# Patient Record
Sex: MALE | Race: BLACK OR AFRICAN AMERICAN | NOT HISPANIC OR LATINO | URBAN - METROPOLITAN AREA
[De-identification: names, ages, dates, MRNs, and addresses within clinical notes are randomized per-mention and may not be internally consistent; named-entity substitution may affect disease eponyms.]

---

## 2018-09-11 ENCOUNTER — EMERGENCY (EMERGENCY)
Facility: HOSPITAL | Age: 58
LOS: 1 days | Discharge: HOME | End: 2018-09-11
Attending: EMERGENCY MEDICINE
Payer: COMMERCIAL

## 2018-09-11 VITALS — WEIGHT: 141.1 LBS

## 2018-09-11 VITALS
HEIGHT: 66 IN | HEART RATE: 103 BPM | OXYGEN SATURATION: 99 % | TEMPERATURE: 98 F | DIASTOLIC BLOOD PRESSURE: 96 MMHG | WEIGHT: 141.98 LBS | SYSTOLIC BLOOD PRESSURE: 149 MMHG | RESPIRATION RATE: 16 BRPM

## 2018-09-11 DIAGNOSIS — R10.30 LOWER ABDOMINAL PAIN, UNSPECIFIED: ICD-10-CM

## 2018-09-11 LAB
ALBUMIN SERPL ELPH-MCNC: 3.5 G/DL — SIGNIFICANT CHANGE UP (ref 3.3–5)
ALP SERPL-CCNC: 68 U/L — SIGNIFICANT CHANGE UP (ref 40–120)
ALT FLD-CCNC: 20 U/L — SIGNIFICANT CHANGE UP (ref 12–78)
ANION GAP SERPL CALC-SCNC: 12 MMOL/L — SIGNIFICANT CHANGE UP (ref 5–17)
APPEARANCE UR: ABNORMAL
APTT BLD: 37.5 SEC — HIGH (ref 27.5–37.4)
AST SERPL-CCNC: 19 U/L — SIGNIFICANT CHANGE UP (ref 15–37)
BACTERIA # UR AUTO: ABNORMAL
BILIRUB SERPL-MCNC: 0.7 MG/DL — SIGNIFICANT CHANGE UP (ref 0.2–1.2)
BILIRUB UR-MCNC: NEGATIVE — SIGNIFICANT CHANGE UP
BUN SERPL-MCNC: 9 MG/DL — SIGNIFICANT CHANGE UP (ref 7–23)
CALCIUM SERPL-MCNC: 9.1 MG/DL — SIGNIFICANT CHANGE UP (ref 8.5–10.1)
CHLORIDE SERPL-SCNC: 101 MMOL/L — SIGNIFICANT CHANGE UP (ref 96–108)
CO2 SERPL-SCNC: 26 MMOL/L — SIGNIFICANT CHANGE UP (ref 22–31)
COLOR SPEC: YELLOW — SIGNIFICANT CHANGE UP
CREAT SERPL-MCNC: 0.97 MG/DL — SIGNIFICANT CHANGE UP (ref 0.5–1.3)
DIFF PNL FLD: ABNORMAL
EPI CELLS # UR: SIGNIFICANT CHANGE UP
GLUCOSE SERPL-MCNC: 79 MG/DL — SIGNIFICANT CHANGE UP (ref 70–99)
GLUCOSE UR QL: NEGATIVE MG/DL — SIGNIFICANT CHANGE UP
HCT VFR BLD CALC: 55.7 % — HIGH (ref 39–50)
HGB BLD-MCNC: 18.7 G/DL — HIGH (ref 13–17)
INR BLD: 1.01 RATIO — SIGNIFICANT CHANGE UP (ref 0.88–1.16)
KETONES UR-MCNC: ABNORMAL
LEUKOCYTE ESTERASE UR-ACNC: NEGATIVE — SIGNIFICANT CHANGE UP
LIDOCAIN IGE QN: 203 U/L — SIGNIFICANT CHANGE UP (ref 73–393)
MCHC RBC-ENTMCNC: 30.7 PG — SIGNIFICANT CHANGE UP (ref 27–34)
MCHC RBC-ENTMCNC: 33.6 GM/DL — SIGNIFICANT CHANGE UP (ref 32–36)
MCV RBC AUTO: 91.5 FL — SIGNIFICANT CHANGE UP (ref 80–100)
NITRITE UR-MCNC: NEGATIVE — SIGNIFICANT CHANGE UP
NRBC # BLD: 0 /100 WBCS — SIGNIFICANT CHANGE UP (ref 0–0)
PH UR: 6 — SIGNIFICANT CHANGE UP (ref 5–8)
PLATELET # BLD AUTO: 345 K/UL — SIGNIFICANT CHANGE UP (ref 150–400)
POTASSIUM SERPL-MCNC: 4.1 MMOL/L — SIGNIFICANT CHANGE UP (ref 3.5–5.3)
POTASSIUM SERPL-SCNC: 4.1 MMOL/L — SIGNIFICANT CHANGE UP (ref 3.5–5.3)
PROT SERPL-MCNC: 7.9 GM/DL — SIGNIFICANT CHANGE UP (ref 6–8.3)
PROT UR-MCNC: 15 MG/DL
PROTHROM AB SERPL-ACNC: 11 SEC — SIGNIFICANT CHANGE UP (ref 9.8–12.7)
RBC # BLD: 6.09 M/UL — HIGH (ref 4.2–5.8)
RBC # FLD: 14.2 % — SIGNIFICANT CHANGE UP (ref 10.3–14.5)
RBC CASTS # UR COMP ASSIST: SIGNIFICANT CHANGE UP /HPF (ref 0–4)
SODIUM SERPL-SCNC: 139 MMOL/L — SIGNIFICANT CHANGE UP (ref 135–145)
SP GR SPEC: 1.02 — SIGNIFICANT CHANGE UP (ref 1.01–1.02)
UROBILINOGEN FLD QL: 1 MG/DL
WBC # BLD: 8.36 K/UL — SIGNIFICANT CHANGE UP (ref 3.8–10.5)
WBC # FLD AUTO: 8.36 K/UL — SIGNIFICANT CHANGE UP (ref 3.8–10.5)
WBC UR QL: SIGNIFICANT CHANGE UP

## 2018-09-11 PROCEDURE — 99285 EMERGENCY DEPT VISIT HI MDM: CPT

## 2018-09-11 PROCEDURE — 74177 CT ABD & PELVIS W/CONTRAST: CPT | Mod: 26

## 2018-09-11 RX ORDER — IOHEXOL 300 MG/ML
30 INJECTION, SOLUTION INTRAVENOUS ONCE
Qty: 0 | Refills: 0 | Status: COMPLETED | OUTPATIENT
Start: 2018-09-11 | End: 2018-09-11

## 2018-09-11 RX ORDER — SODIUM CHLORIDE 9 MG/ML
1000 INJECTION INTRAMUSCULAR; INTRAVENOUS; SUBCUTANEOUS ONCE
Qty: 0 | Refills: 0 | Status: COMPLETED | OUTPATIENT
Start: 2018-09-11 | End: 2018-09-11

## 2018-09-11 RX ORDER — MORPHINE SULFATE 50 MG/1
4 CAPSULE, EXTENDED RELEASE ORAL ONCE
Qty: 0 | Refills: 0 | Status: DISCONTINUED | OUTPATIENT
Start: 2018-09-11 | End: 2018-09-11

## 2018-09-11 RX ADMIN — MORPHINE SULFATE 4 MILLIGRAM(S): 50 CAPSULE, EXTENDED RELEASE ORAL at 20:27

## 2018-09-11 RX ADMIN — SODIUM CHLORIDE 1000 MILLILITER(S): 9 INJECTION INTRAMUSCULAR; INTRAVENOUS; SUBCUTANEOUS at 20:26

## 2018-09-11 RX ADMIN — IOHEXOL 30 MILLILITER(S): 300 INJECTION, SOLUTION INTRAVENOUS at 20:27

## 2018-09-11 NOTE — ED ADULT NURSE NOTE - NSIMPLEMENTINTERV_GEN_ALL_ED
Implemented All Universal Safety Interventions:  [place] to call system. Call bell, personal items and telephone within reach. Instruct patient to call for assistance. Room bathroom lighting operational. Non-slip footwear when patient is off stretcher. Physically safe environment: no spills, clutter or unnecessary equipment. Stretcher in lowest position, wheels locked, appropriate side rails in place.

## 2018-09-11 NOTE — ED ADULT NURSE NOTE - OBJECTIVE STATEMENT
58 y.o male aaox4 ambultory [place] [facility] [place]. Patient reports having low abd pain for 4 weeks now, no nausea or vomtiing, had a small bowel movement today, no chills or fever. Denies any dysuria.

## 2018-09-12 NOTE — ED PROVIDER NOTE - OBJECTIVE STATEMENT
57 yo M with lower abd pain radiating across lower abdomen.  No associated symptoms, no n/v, no other complaints.  Pt. feels well otherwise.  Pain present for two weeks.   ROS: negative for fever, cough, headache, chest pain, shortness of breath, nausea, vomiting, diarrhea, rash, paresthesia, and weakness--all other systems reviewed are negative.   PMH: negative; Meds: Denies; SH: Denies smoking/drinking/drug use

## 2018-09-12 NOTE — ED PROVIDER NOTE - PHYSICAL EXAMINATION
Vitals: WNL  Gen: AAOx3, NAD, sitting comfortably in stretcher  Head: ncat, perrla, eomi b/l  Neck: supple, no lymphadenopathy, no midline deviation  Heart: rrr, no m/r/g  Lungs: CTA b/l, no rales/ronchi/wheezes  Abd: soft, tender in RL and LLQs, non-distended, no rebound or guarding  Ext: no clubbing/cyanosis/edema  Neuro: sensation and muscle strength intact b/l, steady gait

## 2018-09-21 ENCOUNTER — EMERGENCY (EMERGENCY)
Facility: HOSPITAL | Age: 58
LOS: 1 days | Discharge: HOME | End: 2018-09-21
Attending: EMERGENCY MEDICINE | Admitting: EMERGENCY MEDICINE
Payer: COMMERCIAL

## 2018-09-21 VITALS
OXYGEN SATURATION: 100 % | TEMPERATURE: 99 F | SYSTOLIC BLOOD PRESSURE: 161 MMHG | DIASTOLIC BLOOD PRESSURE: 96 MMHG | HEART RATE: 103 BPM | RESPIRATION RATE: 20 BRPM

## 2018-09-21 VITALS
DIASTOLIC BLOOD PRESSURE: 99 MMHG | OXYGEN SATURATION: 100 % | SYSTOLIC BLOOD PRESSURE: 158 MMHG | TEMPERATURE: 98 F | RESPIRATION RATE: 18 BRPM | HEART RATE: 120 BPM

## 2018-09-21 LAB
ALBUMIN SERPL ELPH-MCNC: 3.8 G/DL — SIGNIFICANT CHANGE UP (ref 3.3–5)
ALP SERPL-CCNC: 56 U/L — SIGNIFICANT CHANGE UP (ref 40–120)
ALT FLD-CCNC: 10 U/L — SIGNIFICANT CHANGE UP (ref 4–41)
APTT BLD: 31.4 SEC — SIGNIFICANT CHANGE UP (ref 27.5–37.4)
AST SERPL-CCNC: 13 U/L — SIGNIFICANT CHANGE UP (ref 4–40)
BASE EXCESS BLDV CALC-SCNC: 3.5 MMOL/L — SIGNIFICANT CHANGE UP
BASOPHILS # BLD AUTO: 0.03 K/UL — SIGNIFICANT CHANGE UP (ref 0–0.2)
BASOPHILS NFR BLD AUTO: 0.4 % — SIGNIFICANT CHANGE UP (ref 0–2)
BILIRUB SERPL-MCNC: 0.4 MG/DL — SIGNIFICANT CHANGE UP (ref 0.2–1.2)
BLD GP AB SCN SERPL QL: NEGATIVE — SIGNIFICANT CHANGE UP
BLOOD GAS VENOUS - CREATININE: 0.84 MG/DL — SIGNIFICANT CHANGE UP (ref 0.5–1.3)
BUN SERPL-MCNC: 9 MG/DL — SIGNIFICANT CHANGE UP (ref 7–23)
CALCIUM SERPL-MCNC: 9.2 MG/DL — SIGNIFICANT CHANGE UP (ref 8.4–10.5)
CHLORIDE BLDV-SCNC: 103 MMOL/L — SIGNIFICANT CHANGE UP (ref 96–108)
CHLORIDE SERPL-SCNC: 94 MMOL/L — LOW (ref 98–107)
CO2 SERPL-SCNC: 24 MMOL/L — SIGNIFICANT CHANGE UP (ref 22–31)
CREAT SERPL-MCNC: 0.84 MG/DL — SIGNIFICANT CHANGE UP (ref 0.5–1.3)
EOSINOPHIL # BLD AUTO: 0.05 K/UL — SIGNIFICANT CHANGE UP (ref 0–0.5)
EOSINOPHIL NFR BLD AUTO: 0.7 % — SIGNIFICANT CHANGE UP (ref 0–6)
GAS PNL BLDV: 131 MMOL/L — LOW (ref 136–146)
GLUCOSE BLDV-MCNC: 107 — HIGH (ref 70–99)
GLUCOSE SERPL-MCNC: 102 MG/DL — HIGH (ref 70–99)
HCO3 BLDV-SCNC: 26 MMOL/L — SIGNIFICANT CHANGE UP (ref 20–27)
HCT VFR BLD CALC: 54.1 % — HIGH (ref 39–50)
HCT VFR BLDV CALC: 56.9 % — HIGH (ref 39–51)
HGB BLD-MCNC: 18.4 G/DL — HIGH (ref 13–17)
HGB BLDV-MCNC: 18.6 G/DL — HIGH (ref 13–17)
IMM GRANULOCYTES # BLD AUTO: 0.02 # — SIGNIFICANT CHANGE UP
IMM GRANULOCYTES NFR BLD AUTO: 0.3 % — SIGNIFICANT CHANGE UP (ref 0–1.5)
INR BLD: 1.09 — SIGNIFICANT CHANGE UP (ref 0.88–1.17)
LACTATE BLDV-MCNC: 1.1 MMOL/L — SIGNIFICANT CHANGE UP (ref 0.5–2)
LYMPHOCYTES # BLD AUTO: 2.44 K/UL — SIGNIFICANT CHANGE UP (ref 1–3.3)
LYMPHOCYTES # BLD AUTO: 32 % — SIGNIFICANT CHANGE UP (ref 13–44)
MAGNESIUM SERPL-MCNC: 1.9 MG/DL — SIGNIFICANT CHANGE UP (ref 1.6–2.6)
MCHC RBC-ENTMCNC: 30.8 PG — SIGNIFICANT CHANGE UP (ref 27–34)
MCHC RBC-ENTMCNC: 34 % — SIGNIFICANT CHANGE UP (ref 32–36)
MCV RBC AUTO: 90.6 FL — SIGNIFICANT CHANGE UP (ref 80–100)
MONOCYTES # BLD AUTO: 0.6 K/UL — SIGNIFICANT CHANGE UP (ref 0–0.9)
MONOCYTES NFR BLD AUTO: 7.9 % — SIGNIFICANT CHANGE UP (ref 2–14)
NEUTROPHILS # BLD AUTO: 4.49 K/UL — SIGNIFICANT CHANGE UP (ref 1.8–7.4)
NEUTROPHILS NFR BLD AUTO: 58.7 % — SIGNIFICANT CHANGE UP (ref 43–77)
NRBC # FLD: 0 — SIGNIFICANT CHANGE UP
PCO2 BLDV: 45 MMHG — SIGNIFICANT CHANGE UP (ref 41–51)
PH BLDV: 7.41 PH — SIGNIFICANT CHANGE UP (ref 7.32–7.43)
PHOSPHATE SERPL-MCNC: 3.9 MG/DL — SIGNIFICANT CHANGE UP (ref 2.5–4.5)
PLATELET # BLD AUTO: 352 K/UL — SIGNIFICANT CHANGE UP (ref 150–400)
PMV BLD: 8.6 FL — SIGNIFICANT CHANGE UP (ref 7–13)
PO2 BLDV: 38 MMHG — SIGNIFICANT CHANGE UP (ref 35–40)
POTASSIUM BLDV-SCNC: 3.4 MMOL/L — SIGNIFICANT CHANGE UP (ref 3.4–4.5)
POTASSIUM SERPL-MCNC: 3.9 MMOL/L — SIGNIFICANT CHANGE UP (ref 3.5–5.3)
POTASSIUM SERPL-SCNC: 3.9 MMOL/L — SIGNIFICANT CHANGE UP (ref 3.5–5.3)
PROT SERPL-MCNC: 7.1 G/DL — SIGNIFICANT CHANGE UP (ref 6–8.3)
PROTHROM AB SERPL-ACNC: 12.1 SEC — SIGNIFICANT CHANGE UP (ref 9.8–13.1)
RBC # BLD: 5.97 M/UL — HIGH (ref 4.2–5.8)
RBC # FLD: 13.5 % — SIGNIFICANT CHANGE UP (ref 10.3–14.5)
RH IG SCN BLD-IMP: NEGATIVE — SIGNIFICANT CHANGE UP
SAO2 % BLDV: 67.1 % — SIGNIFICANT CHANGE UP (ref 60–85)
SODIUM SERPL-SCNC: 137 MMOL/L — SIGNIFICANT CHANGE UP (ref 135–145)
WBC # BLD: 7.63 K/UL — SIGNIFICANT CHANGE UP (ref 3.8–10.5)
WBC # FLD AUTO: 7.63 K/UL — SIGNIFICANT CHANGE UP (ref 3.8–10.5)

## 2018-09-21 PROCEDURE — 74177 CT ABD & PELVIS W/CONTRAST: CPT | Mod: 26

## 2018-09-21 PROCEDURE — 99285 EMERGENCY DEPT VISIT HI MDM: CPT

## 2018-09-21 RX ORDER — DOCUSATE SODIUM 100 MG
1 CAPSULE ORAL
Qty: 42 | Refills: 0 | OUTPATIENT
Start: 2018-09-21 | End: 2018-10-04

## 2018-09-21 RX ORDER — OXYCODONE HYDROCHLORIDE 5 MG/1
1 TABLET ORAL
Qty: 20 | Refills: 0 | OUTPATIENT
Start: 2018-09-21 | End: 2018-09-25

## 2018-09-21 RX ORDER — SENNA PLUS 8.6 MG/1
2 TABLET ORAL
Qty: 28 | Refills: 0 | OUTPATIENT
Start: 2018-09-21 | End: 2018-10-04

## 2018-09-21 RX ORDER — POLYETHYLENE GLYCOL 3350 17 G/17G
17 POWDER, FOR SOLUTION ORAL
Qty: 119 | Refills: 0 | OUTPATIENT
Start: 2018-09-21

## 2018-09-21 RX ORDER — MORPHINE SULFATE 50 MG/1
4 CAPSULE, EXTENDED RELEASE ORAL ONCE
Qty: 0 | Refills: 0 | Status: DISCONTINUED | OUTPATIENT
Start: 2018-09-21 | End: 2018-09-21

## 2018-09-21 RX ADMIN — MORPHINE SULFATE 4 MILLIGRAM(S): 50 CAPSULE, EXTENDED RELEASE ORAL at 16:16

## 2018-09-21 NOTE — ED ADULT TRIAGE NOTE - CHIEF COMPLAINT QUOTE
states" I had a colonoscopy on the 17th of this month and has severe pain with abdominal distention and constipated." denies any PMH

## 2018-09-21 NOTE — ED PROVIDER NOTE - MEDICAL DECISION MAKING DETAILS
58 y.o. male with recent colonoscopy on 9/18 for mass found on CT abdomen/pelvis p/w abdominal pain following colonoscopy.

## 2018-09-21 NOTE — ED PROVIDER NOTE - PROGRESS NOTE DETAILS
Spoke with Dr. Joseph Willard, Dr. Koenig's covering partner. Informed him CT results were negative for perforation or obstruction. Ok from his stand point to go home given no complication at this time. Pt pain is improved from before. He is okay with going home, would like to be sent home with a Bowel regiment.

## 2018-09-21 NOTE — ED PROVIDER NOTE - ATTENDING CONTRIBUTION TO CARE
Dr. Najera: I have personally seen and examined this patient at the bedside. I have fully participated in the care of this patient. I have reviewed all pertinent clinical information, including history, physical exam, plan and the Resident's note and agree except as noted. 57yo male recently diagnosed metastatic disease p/w 2-3 wk worsening abd pain, constipation, and decr po DDX high suspicion sbo, increased metastatic disease, colitis PLAN ct abd pelvis po and iv contrast, pain control, labs, admit for surgery or inpt gi.

## 2018-09-21 NOTE — ED ADULT NURSE NOTE - OBJECTIVE STATEMENT
Pt received a&ox3, c/o lower abd pain x 1 week, abd soft non tender, does not appears distended however pt states appears distended from baseline, had colonoscopy 1 week ago, denies NV, endorses mild constipation x 2 days, appears comfortable, pt seen here 1 week ago w ct scan showing concerns for liver and colon ca, pt denies hx of ca, NAD, md eval performed, 16 gauge IV placed in right ac, labs drawn and sent, will continue to monitor.

## 2018-09-21 NOTE — ED PROVIDER NOTE - OBJECTIVE STATEMENT
58 y.o. male with recent colonoscopy on 9/18 for liver mets found on CT abdomen/pelvis p/w abdominal pain. The patient states since the procedure he has been having cramping abdominal pain along the lower abdomen. He also has been having associated constipation and decreased PO intake since the procedure. The patient had similar pain a few weeks, which lead to CT scan being done originally which had found the mets. Colonoscopy showed narrowing of the sigmoid and a polyp in the cecum with pathology showing a benign neoplasm. Currently denies fever, chills, night sweats, cough, nasal congestion, dyspnea, chest pain, palpations, nausea/vomiting, diarrhea, dysuria, urinary frequency, leg swelling, joint pain, and rashes.    Dr. Koenig GI - [phone]  Spoke over the phone concern for obstruction vs perforation. Recommned CT abd/pelvis and advance GI consult if needed for a stent.

## 2018-09-28 ENCOUNTER — OUTPATIENT (OUTPATIENT)
Dept: OUTPATIENT SERVICES | Facility: HOSPITAL | Age: 58
LOS: 1 days | Discharge: HOME | End: 2018-09-28
Payer: COMMERCIAL

## 2018-09-28 DIAGNOSIS — R10.814 LEFT LOWER QUADRANT ABDOMINAL TENDERNESS: ICD-10-CM

## 2018-09-28 PROCEDURE — 74018 RADEX ABDOMEN 1 VIEW: CPT | Mod: 26

## 2018-10-12 PROBLEM — Z00.00 ENCOUNTER FOR PREVENTIVE HEALTH EXAMINATION: Status: ACTIVE | Noted: 2018-10-12

## 2018-10-15 ENCOUNTER — APPOINTMENT (OUTPATIENT)
Dept: SURGICAL ONCOLOGY | Facility: CLINIC | Age: 58
End: 2018-10-15
Payer: COMMERCIAL

## 2018-10-15 VITALS
OXYGEN SATURATION: 96 % | BODY MASS INDEX: 19.13 KG/M2 | RESPIRATION RATE: 17 BRPM | SYSTOLIC BLOOD PRESSURE: 138 MMHG | HEART RATE: 128 BPM | DIASTOLIC BLOOD PRESSURE: 95 MMHG | HEIGHT: 66 IN | WEIGHT: 119 LBS | TEMPERATURE: 98.8 F

## 2018-10-15 DIAGNOSIS — K56.699 OTHER INTESTINAL OBSTRUCTION UNSPECIFIED AS TO PARTIAL VERSUS COMPLETE OBSTRUCTION: ICD-10-CM

## 2018-10-15 PROCEDURE — 99245 OFF/OP CONSLTJ NEW/EST HI 55: CPT

## 2018-10-16 ENCOUNTER — FORM ENCOUNTER (OUTPATIENT)
Age: 58
End: 2018-10-16

## 2018-10-17 ENCOUNTER — OUTPATIENT (OUTPATIENT)
Dept: OUTPATIENT SERVICES | Facility: HOSPITAL | Age: 58
LOS: 1 days | End: 2018-10-17
Payer: COMMERCIAL

## 2018-10-17 ENCOUNTER — APPOINTMENT (OUTPATIENT)
Dept: NUCLEAR MEDICINE | Facility: IMAGING CENTER | Age: 58
End: 2018-10-17
Payer: COMMERCIAL

## 2018-10-17 DIAGNOSIS — K56.699 OTHER INTESTINAL OBSTRUCTION UNSPECIFIED AS TO PARTIAL VERSUS COMPLETE OBSTRUCTION: ICD-10-CM

## 2018-10-17 PROCEDURE — 78815 PET IMAGE W/CT SKULL-THIGH: CPT | Mod: 26,PI

## 2018-10-17 PROCEDURE — 78815 PET IMAGE W/CT SKULL-THIGH: CPT

## 2018-10-17 PROCEDURE — A9552: CPT

## 2018-10-18 ENCOUNTER — OUTPATIENT (OUTPATIENT)
Dept: OUTPATIENT SERVICES | Facility: HOSPITAL | Age: 58
LOS: 1 days | Discharge: HOME | End: 2018-10-18

## 2018-10-18 DIAGNOSIS — C18.9 MALIGNANT NEOPLASM OF COLON, UNSPECIFIED: ICD-10-CM

## 2018-10-19 ENCOUNTER — RESULT REVIEW (OUTPATIENT)
Age: 58
End: 2018-10-19

## 2018-10-19 ENCOUNTER — APPOINTMENT (OUTPATIENT)
Age: 58
End: 2018-10-19
Payer: COMMERCIAL

## 2018-10-19 VITALS
WEIGHT: 116.17 LBS | RESPIRATION RATE: 16 BRPM | TEMPERATURE: 98.1 F | HEIGHT: 67.2 IN | SYSTOLIC BLOOD PRESSURE: 145 MMHG | BODY MASS INDEX: 18.02 KG/M2 | OXYGEN SATURATION: 98 % | HEART RATE: 125 BPM | DIASTOLIC BLOOD PRESSURE: 100 MMHG

## 2018-10-19 DIAGNOSIS — Z80.0 FAMILY HISTORY OF MALIGNANT NEOPLASM OF DIGESTIVE ORGANS: ICD-10-CM

## 2018-10-19 DIAGNOSIS — Z78.9 OTHER SPECIFIED HEALTH STATUS: ICD-10-CM

## 2018-10-19 DIAGNOSIS — Z87.891 PERSONAL HISTORY OF NICOTINE DEPENDENCE: ICD-10-CM

## 2018-10-19 DIAGNOSIS — K59.00 CONSTIPATION, UNSPECIFIED: ICD-10-CM

## 2018-10-19 LAB
ALBUMIN SERPL ELPH-MCNC: 3.6 G/DL
ALP BLD-CCNC: 120 U/L
ALT SERPL-CCNC: 12 U/L
ANION GAP SERPL CALC-SCNC: 21 MMOL/L
APTT BLD: 30.5 SEC
AST SERPL-CCNC: 15 U/L
BASOPHILS # BLD AUTO: 0 K/UL — SIGNIFICANT CHANGE UP (ref 0–0.2)
BASOPHILS NFR BLD AUTO: 0.3 % — SIGNIFICANT CHANGE UP (ref 0–2)
BILIRUB SERPL-MCNC: 0.7 MG/DL
BUN SERPL-MCNC: 18 MG/DL
CALCIUM SERPL-MCNC: 9.5 MG/DL
CEA SERPL-MCNC: 37 NG/ML
CHLORIDE SERPL-SCNC: 93 MMOL/L
CO2 SERPL-SCNC: 25 MMOL/L
CREAT SERPL-MCNC: 0.64 MG/DL
EOSINOPHIL # BLD AUTO: 0 K/UL — SIGNIFICANT CHANGE UP (ref 0–0.5)
EOSINOPHIL NFR BLD AUTO: 0.2 % — SIGNIFICANT CHANGE UP (ref 0–6)
GLUCOSE SERPL-MCNC: 107 MG/DL
HBV CORE IGG+IGM SER QL: NONREACTIVE
HBV SURFACE AB SER QL: NONREACTIVE
HBV SURFACE AG SER QL: NONREACTIVE
HCT VFR BLD CALC: 49.7 % — SIGNIFICANT CHANGE UP (ref 39–50)
HCV AB SER QL: NONREACTIVE
HCV S/CO RATIO: 0.1 S/CO
HGB BLD-MCNC: 16.3 G/DL — SIGNIFICANT CHANGE UP (ref 13–17)
INR PPP: 1.11 RATIO
LYMPHOCYTES # BLD AUTO: 1 K/UL — SIGNIFICANT CHANGE UP (ref 1–3.3)
LYMPHOCYTES # BLD AUTO: 11.9 % — LOW (ref 13–44)
MCHC RBC-ENTMCNC: 30.4 PG — SIGNIFICANT CHANGE UP (ref 27–34)
MCHC RBC-ENTMCNC: 32.7 G/DL — SIGNIFICANT CHANGE UP (ref 32–36)
MCV RBC AUTO: 92.7 FL — SIGNIFICANT CHANGE UP (ref 80–100)
MONOCYTES # BLD AUTO: 0.6 K/UL — SIGNIFICANT CHANGE UP (ref 0–0.9)
MONOCYTES NFR BLD AUTO: 7.6 % — SIGNIFICANT CHANGE UP (ref 2–14)
NEUTROPHILS # BLD AUTO: 6.6 K/UL — SIGNIFICANT CHANGE UP (ref 1.8–7.4)
NEUTROPHILS NFR BLD AUTO: 80.1 % — HIGH (ref 43–77)
PLATELET # BLD AUTO: 484 K/UL — HIGH (ref 150–400)
POTASSIUM SERPL-SCNC: 4.2 MMOL/L
PROT SERPL-MCNC: 6.9 G/DL
PT BLD: 12.6 SEC
RBC # BLD: 5.36 M/UL — SIGNIFICANT CHANGE UP (ref 4.2–5.8)
RBC # FLD: 12.7 % — SIGNIFICANT CHANGE UP (ref 10.3–14.5)
SODIUM SERPL-SCNC: 139 MMOL/L
WBC # BLD: 8.3 K/UL — SIGNIFICANT CHANGE UP (ref 3.8–10.5)
WBC # FLD AUTO: 8.3 K/UL — SIGNIFICANT CHANGE UP (ref 3.8–10.5)

## 2018-10-19 PROCEDURE — 99245 OFF/OP CONSLTJ NEW/EST HI 55: CPT

## 2018-10-19 RX ORDER — MIRTAZAPINE 15 MG/1
15 TABLET, FILM COATED ORAL
Qty: 30 | Refills: 1 | Status: ACTIVE | COMMUNITY
Start: 2018-10-19 | End: 1900-01-01

## 2018-10-19 RX ORDER — METOCLOPRAMIDE 10 MG/1
10 TABLET ORAL
Qty: 30 | Refills: 3 | Status: ACTIVE | COMMUNITY
Start: 2018-10-19 | End: 1900-01-01

## 2018-10-19 RX ORDER — LACTULOSE 10 G/15ML
10 SOLUTION ORAL DAILY
Qty: 1 | Refills: 3 | Status: ACTIVE | COMMUNITY
Start: 2018-10-19 | End: 1900-01-01

## 2018-10-20 ENCOUNTER — TRANSCRIPTION ENCOUNTER (OUTPATIENT)
Age: 58
End: 2018-10-20

## 2018-10-21 ENCOUNTER — RESULT REVIEW (OUTPATIENT)
Age: 58
End: 2018-10-21

## 2018-10-21 LAB — CANCER AG19-9 SERPL-ACNC: 59.8 U/ML

## 2018-10-22 ENCOUNTER — FORM ENCOUNTER (OUTPATIENT)
Age: 58
End: 2018-10-22

## 2018-10-23 ENCOUNTER — OUTPATIENT (OUTPATIENT)
Dept: OUTPATIENT SERVICES | Facility: HOSPITAL | Age: 58
LOS: 1 days | End: 2018-10-23
Payer: COMMERCIAL

## 2018-10-23 ENCOUNTER — APPOINTMENT (OUTPATIENT)
Dept: MRI IMAGING | Facility: IMAGING CENTER | Age: 58
End: 2018-10-23
Payer: COMMERCIAL

## 2018-10-23 DIAGNOSIS — K56.699 OTHER INTESTINAL OBSTRUCTION UNSPECIFIED AS TO PARTIAL VERSUS COMPLETE OBSTRUCTION: ICD-10-CM

## 2018-10-23 PROCEDURE — 74183 MRI ABD W/O CNTR FLWD CNTR: CPT

## 2018-10-23 PROCEDURE — A9585: CPT

## 2018-10-23 PROCEDURE — 74183 MRI ABD W/O CNTR FLWD CNTR: CPT | Mod: 26

## 2018-10-26 ENCOUNTER — FORM ENCOUNTER (OUTPATIENT)
Age: 58
End: 2018-10-26

## 2018-10-27 ENCOUNTER — APPOINTMENT (OUTPATIENT)
Dept: ULTRASOUND IMAGING | Facility: IMAGING CENTER | Age: 58
End: 2018-10-27
Payer: COMMERCIAL

## 2018-10-27 ENCOUNTER — OUTPATIENT (OUTPATIENT)
Dept: OUTPATIENT SERVICES | Facility: HOSPITAL | Age: 58
LOS: 1 days | End: 2018-10-27
Payer: COMMERCIAL

## 2018-10-27 DIAGNOSIS — C80.1 MALIGNANT (PRIMARY) NEOPLASM, UNSPECIFIED: ICD-10-CM

## 2018-10-27 PROBLEM — K59.00 CONSTIPATION: Status: ACTIVE | Noted: 2018-10-19

## 2018-10-27 PROCEDURE — 93970 EXTREMITY STUDY: CPT | Mod: 26

## 2018-10-27 PROCEDURE — 93970 EXTREMITY STUDY: CPT

## 2018-10-27 RX ORDER — RIVAROXABAN 15 MG/1
15 TABLET, FILM COATED ORAL TWICE DAILY
Qty: 42 | Refills: 0 | Status: ACTIVE | COMMUNITY
Start: 2018-10-27 | End: 1900-01-01

## 2018-10-29 ENCOUNTER — INPATIENT (INPATIENT)
Facility: HOSPITAL | Age: 58
LOS: 9 days | Discharge: HOME HEALTH | End: 2018-11-08
Attending: HOSPITALIST | Admitting: HOSPITALIST
Payer: COMMERCIAL

## 2018-10-29 VITALS
WEIGHT: 116.84 LBS | OXYGEN SATURATION: 99 % | DIASTOLIC BLOOD PRESSURE: 95 MMHG | SYSTOLIC BLOOD PRESSURE: 131 MMHG | TEMPERATURE: 98 F | HEART RATE: 122 BPM | RESPIRATION RATE: 17 BRPM

## 2018-10-29 DIAGNOSIS — I26.99 OTHER PULMONARY EMBOLISM WITHOUT ACUTE COR PULMONALE: ICD-10-CM

## 2018-10-29 DIAGNOSIS — Z90.49 ACQUIRED ABSENCE OF OTHER SPECIFIED PARTS OF DIGESTIVE TRACT: Chronic | ICD-10-CM

## 2018-10-29 DIAGNOSIS — Z29.9 ENCOUNTER FOR PROPHYLACTIC MEASURES, UNSPECIFIED: ICD-10-CM

## 2018-10-29 DIAGNOSIS — E43 UNSPECIFIED SEVERE PROTEIN-CALORIE MALNUTRITION: ICD-10-CM

## 2018-10-29 DIAGNOSIS — C80.1 MALIGNANT (PRIMARY) NEOPLASM, UNSPECIFIED: ICD-10-CM

## 2018-10-29 LAB
ALBUMIN SERPL ELPH-MCNC: 3.2 G/DL — LOW (ref 3.3–5)
ALP SERPL-CCNC: 118 U/L — SIGNIFICANT CHANGE UP (ref 40–120)
ALT FLD-CCNC: 13 U/L — SIGNIFICANT CHANGE UP (ref 4–41)
APTT BLD: 29.8 SEC — SIGNIFICANT CHANGE UP (ref 27.5–37.4)
APTT BLD: 35.8 SEC — SIGNIFICANT CHANGE UP (ref 27.5–37.4)
AST SERPL-CCNC: 18 U/L — SIGNIFICANT CHANGE UP (ref 4–40)
BASE EXCESS BLDV CALC-SCNC: 2.1 MMOL/L — SIGNIFICANT CHANGE UP
BASOPHILS # BLD AUTO: 0.02 K/UL — SIGNIFICANT CHANGE UP (ref 0–0.2)
BASOPHILS NFR BLD AUTO: 0.3 % — SIGNIFICANT CHANGE UP (ref 0–2)
BILIRUB SERPL-MCNC: 0.9 MG/DL — SIGNIFICANT CHANGE UP (ref 0.2–1.2)
BLD GP AB SCN SERPL QL: NEGATIVE — SIGNIFICANT CHANGE UP
BLOOD GAS VENOUS - CREATININE: 0.67 MG/DL — SIGNIFICANT CHANGE UP (ref 0.5–1.3)
BUN SERPL-MCNC: 17 MG/DL — SIGNIFICANT CHANGE UP (ref 7–23)
CALCIUM SERPL-MCNC: 9.1 MG/DL — SIGNIFICANT CHANGE UP (ref 8.4–10.5)
CHLORIDE BLDV-SCNC: 98 MMOL/L — SIGNIFICANT CHANGE UP (ref 96–108)
CHLORIDE SERPL-SCNC: 95 MMOL/L — LOW (ref 98–107)
CO2 SERPL-SCNC: 28 MMOL/L — SIGNIFICANT CHANGE UP (ref 22–31)
CREAT SERPL-MCNC: 0.61 MG/DL — SIGNIFICANT CHANGE UP (ref 0.5–1.3)
EOSINOPHIL # BLD AUTO: 0.01 K/UL — SIGNIFICANT CHANGE UP (ref 0–0.5)
EOSINOPHIL NFR BLD AUTO: 0.1 % — SIGNIFICANT CHANGE UP (ref 0–6)
GAS PNL BLDV: 139 MMOL/L — SIGNIFICANT CHANGE UP (ref 136–146)
GLUCOSE BLDV-MCNC: 107 — HIGH (ref 70–99)
GLUCOSE SERPL-MCNC: 108 MG/DL — HIGH (ref 70–99)
HCO3 BLDV-SCNC: 24 MMOL/L — SIGNIFICANT CHANGE UP (ref 20–27)
HCT VFR BLD CALC: 41.2 % — SIGNIFICANT CHANGE UP (ref 39–50)
HCT VFR BLD CALC: 46.7 % — SIGNIFICANT CHANGE UP (ref 39–50)
HCT VFR BLDV CALC: 48.4 % — SIGNIFICANT CHANGE UP (ref 39–51)
HGB BLD-MCNC: 13.7 G/DL — SIGNIFICANT CHANGE UP (ref 13–17)
HGB BLD-MCNC: 15.1 G/DL — SIGNIFICANT CHANGE UP (ref 13–17)
HGB BLDV-MCNC: 15.8 G/DL — SIGNIFICANT CHANGE UP (ref 13–17)
IMM GRANULOCYTES # BLD AUTO: 0.03 # — SIGNIFICANT CHANGE UP
IMM GRANULOCYTES NFR BLD AUTO: 0.4 % — SIGNIFICANT CHANGE UP (ref 0–1.5)
INR BLD: 1.1 — SIGNIFICANT CHANGE UP (ref 0.88–1.17)
LACTATE BLDV-MCNC: 1.7 MMOL/L — SIGNIFICANT CHANGE UP (ref 0.5–2)
LYMPHOCYTES # BLD AUTO: 1.2 K/UL — SIGNIFICANT CHANGE UP (ref 1–3.3)
LYMPHOCYTES # BLD AUTO: 16.6 % — SIGNIFICANT CHANGE UP (ref 13–44)
MCHC RBC-ENTMCNC: 29.8 PG — SIGNIFICANT CHANGE UP (ref 27–34)
MCHC RBC-ENTMCNC: 30 PG — SIGNIFICANT CHANGE UP (ref 27–34)
MCHC RBC-ENTMCNC: 32.3 % — SIGNIFICANT CHANGE UP (ref 32–36)
MCHC RBC-ENTMCNC: 33.3 % — SIGNIFICANT CHANGE UP (ref 32–36)
MCV RBC AUTO: 89.6 FL — SIGNIFICANT CHANGE UP (ref 80–100)
MCV RBC AUTO: 92.7 FL — SIGNIFICANT CHANGE UP (ref 80–100)
MONOCYTES # BLD AUTO: 0.68 K/UL — SIGNIFICANT CHANGE UP (ref 0–0.9)
MONOCYTES NFR BLD AUTO: 9.4 % — SIGNIFICANT CHANGE UP (ref 2–14)
NEUTROPHILS # BLD AUTO: 5.31 K/UL — SIGNIFICANT CHANGE UP (ref 1.8–7.4)
NEUTROPHILS NFR BLD AUTO: 73.2 % — SIGNIFICANT CHANGE UP (ref 43–77)
NRBC # FLD: 0 — SIGNIFICANT CHANGE UP
NRBC # FLD: 0 — SIGNIFICANT CHANGE UP
OB PNL STL: NEGATIVE — SIGNIFICANT CHANGE UP
PCO2 BLDV: 48 MMHG — SIGNIFICANT CHANGE UP (ref 41–51)
PH BLDV: 7.37 PH — SIGNIFICANT CHANGE UP (ref 7.32–7.43)
PLATELET # BLD AUTO: 303 K/UL — SIGNIFICANT CHANGE UP (ref 150–400)
PLATELET # BLD AUTO: 316 K/UL — SIGNIFICANT CHANGE UP (ref 150–400)
PMV BLD: 8.5 FL — SIGNIFICANT CHANGE UP (ref 7–13)
PMV BLD: 8.6 FL — SIGNIFICANT CHANGE UP (ref 7–13)
PO2 BLDV: 29 MMHG — LOW (ref 35–40)
POTASSIUM BLDV-SCNC: 3.7 MMOL/L — SIGNIFICANT CHANGE UP (ref 3.4–4.5)
POTASSIUM SERPL-MCNC: 3.9 MMOL/L — SIGNIFICANT CHANGE UP (ref 3.5–5.3)
POTASSIUM SERPL-SCNC: 3.9 MMOL/L — SIGNIFICANT CHANGE UP (ref 3.5–5.3)
PROT SERPL-MCNC: 6.9 G/DL — SIGNIFICANT CHANGE UP (ref 6–8.3)
PROTHROM AB SERPL-ACNC: 12.7 SEC — SIGNIFICANT CHANGE UP (ref 9.8–13.1)
RBC # BLD: 4.6 M/UL — SIGNIFICANT CHANGE UP (ref 4.2–5.8)
RBC # BLD: 5.04 M/UL — SIGNIFICANT CHANGE UP (ref 4.2–5.8)
RBC # FLD: 13.5 % — SIGNIFICANT CHANGE UP (ref 10.3–14.5)
RBC # FLD: 13.6 % — SIGNIFICANT CHANGE UP (ref 10.3–14.5)
RH IG SCN BLD-IMP: NEGATIVE — SIGNIFICANT CHANGE UP
SAO2 % BLDV: 47.1 % — LOW (ref 60–85)
SODIUM SERPL-SCNC: 139 MMOL/L — SIGNIFICANT CHANGE UP (ref 135–145)
TROPONIN T, HIGH SENSITIVITY: 8 NG/L — SIGNIFICANT CHANGE UP (ref ?–14)
WBC # BLD: 6.48 K/UL — SIGNIFICANT CHANGE UP (ref 3.8–10.5)
WBC # BLD: 7.25 K/UL — SIGNIFICANT CHANGE UP (ref 3.8–10.5)
WBC # FLD AUTO: 6.48 K/UL — SIGNIFICANT CHANGE UP (ref 3.8–10.5)
WBC # FLD AUTO: 7.25 K/UL — SIGNIFICANT CHANGE UP (ref 3.8–10.5)

## 2018-10-29 PROCEDURE — 99223 1ST HOSP IP/OBS HIGH 75: CPT

## 2018-10-29 PROCEDURE — 71275 CT ANGIOGRAPHY CHEST: CPT | Mod: 26

## 2018-10-29 PROCEDURE — 99232 SBSQ HOSP IP/OBS MODERATE 35: CPT | Mod: GC

## 2018-10-29 RX ORDER — HEPARIN SODIUM 5000 [USP'U]/ML
INJECTION INTRAVENOUS; SUBCUTANEOUS
Qty: 25000 | Refills: 0 | Status: DISCONTINUED | OUTPATIENT
Start: 2018-10-29 | End: 2018-10-30

## 2018-10-29 RX ORDER — AMLODIPINE BESYLATE 2.5 MG/1
5 TABLET ORAL ONCE
Qty: 0 | Refills: 0 | Status: DISCONTINUED | OUTPATIENT
Start: 2018-10-29 | End: 2018-10-29

## 2018-10-29 RX ORDER — AER TRAVELER 0.5 G/1
1 SOLUTION RECTAL; TOPICAL THREE TIMES A DAY
Qty: 0 | Refills: 0 | Status: DISCONTINUED | OUTPATIENT
Start: 2018-10-29 | End: 2018-11-08

## 2018-10-29 RX ORDER — DOCUSATE SODIUM 100 MG
100 CAPSULE ORAL
Qty: 0 | Refills: 0 | Status: DISCONTINUED | OUTPATIENT
Start: 2018-10-29 | End: 2018-11-08

## 2018-10-29 RX ORDER — HEPARIN SODIUM 5000 [USP'U]/ML
2000 INJECTION INTRAVENOUS; SUBCUTANEOUS EVERY 6 HOURS
Qty: 0 | Refills: 0 | Status: DISCONTINUED | OUTPATIENT
Start: 2018-10-29 | End: 2018-10-30

## 2018-10-29 RX ORDER — HEPARIN SODIUM 5000 [USP'U]/ML
4000 INJECTION INTRAVENOUS; SUBCUTANEOUS EVERY 6 HOURS
Qty: 0 | Refills: 0 | Status: DISCONTINUED | OUTPATIENT
Start: 2018-10-29 | End: 2018-10-30

## 2018-10-29 RX ORDER — INFLUENZA VIRUS VACCINE 15; 15; 15; 15 UG/.5ML; UG/.5ML; UG/.5ML; UG/.5ML
0.5 SUSPENSION INTRAMUSCULAR ONCE
Qty: 0 | Refills: 0 | Status: COMPLETED | OUTPATIENT
Start: 2018-10-29 | End: 2018-10-29

## 2018-10-29 RX ORDER — SENNA PLUS 8.6 MG/1
2 TABLET ORAL AT BEDTIME
Qty: 0 | Refills: 0 | Status: DISCONTINUED | OUTPATIENT
Start: 2018-10-29 | End: 2018-11-08

## 2018-10-29 RX ORDER — ACETAMINOPHEN 500 MG
650 TABLET ORAL EVERY 6 HOURS
Qty: 0 | Refills: 0 | Status: DISCONTINUED | OUTPATIENT
Start: 2018-10-29 | End: 2018-11-08

## 2018-10-29 RX ORDER — SODIUM CHLORIDE 9 MG/ML
1000 INJECTION INTRAMUSCULAR; INTRAVENOUS; SUBCUTANEOUS ONCE
Qty: 0 | Refills: 0 | Status: COMPLETED | OUTPATIENT
Start: 2018-10-29 | End: 2018-10-29

## 2018-10-29 RX ADMIN — HEPARIN SODIUM 1200 UNIT(S)/HR: 5000 INJECTION INTRAVENOUS; SUBCUTANEOUS at 23:49

## 2018-10-29 RX ADMIN — HEPARIN SODIUM 4000 UNIT(S): 5000 INJECTION INTRAVENOUS; SUBCUTANEOUS at 23:53

## 2018-10-29 RX ADMIN — HEPARIN SODIUM 1000 UNIT(S)/HR: 5000 INJECTION INTRAVENOUS; SUBCUTANEOUS at 17:30

## 2018-10-29 RX ADMIN — SODIUM CHLORIDE 500 MILLILITER(S): 9 INJECTION INTRAMUSCULAR; INTRAVENOUS; SUBCUTANEOUS at 14:00

## 2018-10-29 RX ADMIN — SODIUM CHLORIDE 1000 MILLILITER(S): 9 INJECTION INTRAMUSCULAR; INTRAVENOUS; SUBCUTANEOUS at 15:05

## 2018-10-29 NOTE — ED ADULT NURSE NOTE - OBJECTIVE STATEMENT
received pt to rm 2 c/o SOB/abdominal discomfort/weakness, pt dx with DCT lft calf 2 days ago by PCP, placed on Xarelto, SOB in new for him, pt had colonoscopy previousely and has not felt well since, decreased PO intake, pt states he lost 40lbs in 2 months, diastolic elevated as documented, sinus tachy on cm, pt A&Ox4, O2 ssat 100% RA, afebrile rectal, IV access rt ac 20g, labs sent, 1L NS bolus initiated, bedside US ongoing at this time.

## 2018-10-29 NOTE — H&P ADULT - HISTORY OF PRESENT ILLNESS
59 y/o M sent in by oncologist for evaluation and treatment of PE. Patient reports that since July he has lost 40lbs due to decreased appetite and abdominal bloating and distention. He states that he underwent CT which showed irregularity in his sigmoid colon for which he underwent colonoscopy and pathology from that was negative. He also underwent EGD last week and as far as he knows it was normal. 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 Patient had reported to his oncologist that he had some pain in his left leg for which dopplers were performed. Dopplers were positive for DVT and patient was started on xarelto. Patient reports he has been taking xarelto for only 2 days. Today he spoke to his oncologist and reported that he has also had SOB recently. Patient was sent in to be evaluated for PE. Today he denies any chest pain, acute SOB, LE edema or swelling. He does report worsening abdominal distention and discomfort.     Vital Signs Last 24 Hrs  T(C): 37.4 (29 Oct 2018 12:56), Max: 37.4 (29 Oct 2018 12:56)  T(F): 99.4 (29 Oct 2018 12:56), Max: 99.4 (29 Oct 2018 12:56)  HR: 107 (29 Oct 2018 14:43) (107 - 122)  BP: 154/110 (29 Oct 2018 14:43) (131/95 - 157/107)  RR: 17 (29 Oct 2018 14:43) (17 - 25)  SpO2: 99% (29 Oct 2018 14:43) (99% - 99%)

## 2018-10-29 NOTE — CONSULT NOTE ADULT - SUBJECTIVE AND OBJECTIVE BOX
59 y/o M, well known to me, recent diagnosis of metastatic GEJ adenocarcinoma. He had been experiencing significant weight loss, abdominal bloating and distention over the past few mos. Work up revealed peritoneal disease with large volume ascites and liver mets. Last week he underwent an EGD which showed a mass in the GEJ junction bx proven to be adenocarcinoma. Of note, pt is not yet aware of his diagnosis. During the initial out pt onc consultation, pt c/o LLE pain and swelling. Dopplers performed this past weekend c/w extensive LE DVT. Pt refused to go to the ER and started Xarelto on Sunday. Today, I spoke to the pt and he c/o progressive SOB, worse with laying down. I sent him in to the ER for eval of PE. In addition, a diagnostic/therapeutic paracentesis was scheduled for tomorrow but due to the difficulties managing the anticoagulation, I felt it was better if the pt was admitted.     Currently he c/o abdominal discomfort, poor appetite, generalized weakness. SOB is stable, not bothersome at this time. Denies any bleeding, palpitations, cough.       Vital Signs Last 24 Hrs  T(C): 37.4 (29 Oct 2018 12:56), Max: 37.4 (29 Oct 2018 12:56)  T(F): 99.4 (29 Oct 2018 12:56), Max: 99.4 (29 Oct 2018 12:56)  HR: 107 (29 Oct 2018 14:43) (107 - 122)  BP: 154/110 (29 Oct 2018 14:43) (131/95 - 157/107)  RR: 17 (29 Oct 2018 14:43) (17 - 25)  SpO2: 99% (29 Oct 2018 14:43) (99% - 99%) (29 Oct 2018 16:32)       ROS: as above       PAST MEDICAL & SURGICAL HISTORY:  Esophageal cancer, stage IV  History of appendectomy      SOCIAL HISTORY: from [place] worked as a , remote history of alcohol and tobacco use    FAMILY HISTORY:  Family history of colon cancer (Sibling)      MEDICATIONS  (STANDING):  docusate sodium 100 milliGRAM(s) Oral two times a day  heparin  Infusion.  Unit(s)/Hr (10 mL/Hr) IV Continuous <Continuous>  influenza   Vaccine 0.5 milliLiter(s) IntraMuscular once  senna 2 Tablet(s) Oral at bedtime    MEDICATIONS  (PRN):  acetaminophen   Tablet .. 650 milliGRAM(s) Oral every 6 hours PRN Temp greater or equal to 38C (100.4F), Mild Pain (1 - 3)  heparin  Injectable 4000 Unit(s) IV Push every 6 hours PRN For aPTT less than 40  heparin  Injectable 2000 Unit(s) IV Push every 6 hours PRN For aPTT between 40 - 57  witch hazel Pads 1 Application(s) Topical three times a day PRN hemmorhoidal pain      Allergies    No Known Allergies    Intolerances        Vital Signs Last 24 Hrs  T(C): 36.7 (30 Oct 2018 05:24), Max: 37.4 (29 Oct 2018 12:56)  T(F): 98.1 (30 Oct 2018 05:24), Max: 99.4 (29 Oct 2018 12:56)  HR: 102 (30 Oct 2018 05:24) (102 - 122)  BP: 140/98 (30 Oct 2018 05:24) (131/95 - 157/107)  BP(mean): --  RR: 18 (30 Oct 2018 05:24) (16 - 25)  SpO2: 100% (30 Oct 2018 05:24) (99% - 100%)    PHYSICAL EXAM  General: adult in NAD, + cachectic  HEENT: dry mucous membranes, temporal wasting  Neck: supple  CV: normal S1/S2 with no murmur rubs or gallops  Lungs: positive air movement b/l ant lungs,clear to auscultation, no wheezes, no rales  Abdomen: distended, mildly TTP   Ext: + edema LLE   Skin: no rashes and no petechiae  Neuro: alert and oriented X 4, no focal deficits      LABS:                          13.4   6.40  )-----------( 317      ( 30 Oct 2018 05:30 )             41.5         Mean Cell Volume : 92.4 fL  Mean Cell Hemoglobin : 29.8 pg  Mean Cell Hemoglobin Concentration : 32.3 %  Auto Neutrophil # : x  Auto Lymphocyte # : x  Auto Monocyte # : x  Auto Eosinophil # : x  Auto Basophil # : x  Auto Neutrophil % : x  Auto Lymphocyte % : x  Auto Monocyte % : x  Auto Eosinophil % : x  Auto Basophil % : x      Serial CBC's  10-30 @ 05:30  Hct-41.5 / Hgb-13.4 / Plat-317 / RBC-4.49 / WBC-6.40  Serial CBC's  10-29 @ 23:20  Hct-41.2 / Hgb-13.7 / Plat-303 / RBC-4.60 / WBC-6.48  Serial CBC's  10-29 @ 12:45  Hct-46.7 / Hgb-15.1 / Plat-316 / RBC-5.04 / WBC-7.25      10-30    138  |  98  |  16  ----------------------------<  108<H>  3.9   |  24  |  0.52    Ca    8.7      30 Oct 2018 05:30  Phos  3.5     10-30  Mg     2.2     10-30    TPro  6.9  /  Alb  3.2<L>  /  TBili  0.9  /  DBili  x   /  AST  18  /  ALT  13  /  AlkPhos  118  10-29      PT/INR - ( 29 Oct 2018 12:45 )   PT: 12.7 SEC;   INR: 1.10          PTT - ( 30 Oct 2018 05:30 )  PTT:50.6 SEC                RADIOLOGY & ADDITIONAL STUDIES: < from: CT Angio Chest w/ IV Cont (10.29.18 @ 14:12) >  EXAM:  CT ANGIO CHEST (W)AW IC        PROCEDURE DATE:  Oct 29 2018         INTERPRETATION:  CLINICAL INFORMATION: Shortness of breath. History of   DVT. History of peritoneal carcinomatosis, hepatic lesions, and GE   junction soft tissue thickeningwith avid enhancement.    COMPARISON: PET/CT 10/17/2018 and CT abdomen and pelvis 9/21/2018    PROCEDURE:   CT Angiography of the Chest.  90 ml of Omnipaque 350 was injected intravenously. 10 ml were discarded.  Sagittal and coronal reformats were performed as well as 3D (MIP)   reconstructions.    FINDINGS:    CHEST:     LUNGS AND LARGE AIRWAYS: 1.2 cm right lower lobe nodular opacity within   the superior segment as on the PET CT of October 17, 2018. Centrilobular   emphysema. There is debris in the upper trachea. Bibasilar areas of   atelectasis.  PLEURA: Small bilateral pleural effusions which are new compared to prior   PET/CT of October 17, 2018 and as noted on the MRI of October 23, 2018.  VESSELS: There are pulmonary arterial emboli within the left upper lobe   pulmonary artery extending into segmental branches. Subsegmental left   upper lobe pulmonary arterial emboli are also noted. There is pulmonary   embolus within the lingular pulmonary artery. Pulmonary arterial emboli   arealso noted within the left lower lobe pulmonary artery in addition to   a segmental branch within the left lower lobe although evaluation is   limited due to respiratory motion artifact. There is a right upper lobe   pulmonary arterial embolus extending into the segmental branches.   Evaluation of the right lung base is limited due to respiratory motion   artifact. Some of the pulmonary arterial emboli have an eccentric   appearance suggesting chronicity. Coronary artery calcifications are   noted.  HEART:  No pericardial effusion. No CT evidence of right heart strain.  MEDIASTINUM AND MARIANA: No lymphadenopathy.  CHEST WALL AND LOWER NECK: Within normal limits.  VISUALIZED UPPER ABDOMEN: Unchanged low-attenuation hepatic lesions   compatible with metastases. Partially imaged large volume ascites. GE   junction thickening as noted on the abdominal MRI of October 23, 2018.   Partially imaged omental nodularity.  BONES: Within normal limits.    IMPRESSION: Multiple bilateral pulmonary arterial emboli as above, some   of which have an eccentric appearance suggesting chronicity.     Small b/l pleural effusions    1.2 cm right lower lobe nodular opacity as on October 17, 2018 PET/CT may   represent a focus of atelectasis however, three-monthfollow-up chest CT   is recommended for further evaluation.    Dr. Milligan discussed the above findings with Dr. Parker at 2:38 PM with   readback.                ZACK MILLIGAN M.D., RADIOLOGY RESIDENT  This document has been electronically signed.  LEÓN KU M.D. ATTENDING RADIOLOGIST  This document has been electronically signed. Oct 29 2018  3:34PM                  < end of copied text >

## 2018-10-29 NOTE — H&P ADULT - NSHPLABSRESULTS_GEN_ALL_CORE
15.1   7.25  )-----------( 316      ( 29 Oct 2018 12:45 )             46.7   10-29    139  |  95<L>  |  17  ----------------------------<  108<H>  3.9   |  28  |  0.61    Ca    9.1      29 Oct 2018 12:45    TPro  6.9  /  Alb  3.2<L>  /  TBili  0.9  /  DBili  x   /  AST  18  /  ALT  13  /  AlkPhos  118  10-29

## 2018-10-29 NOTE — ED PROCEDURE NOTE - PROCEDURE ADDITIONAL DETAILS
Focused ED Ultrasound of left lower extremity  [id number]    In both grey scale and color doppler from common femoral vein into superficial and deep femoral vein, and again at the popliteal vein inferiorly past  the trifurcation:  Thrombus visualized at all sites.      Impression: Positive for LLE DVT

## 2018-10-29 NOTE — H&P ADULT - PROBLEM SELECTOR PLAN 1
-Hypercoagulable state given active malignancy   -DVT and PE found  -Start heparin gtt for now. Transition to lovenox upon DC. Heparin started due to possible need for para and biopsy of GEJ mass if path from EGD found to be negative.   -Risks vs benefit of a/c discussed with patient

## 2018-10-29 NOTE — ED PROVIDER NOTE - OBJECTIVE STATEMENT
57 yo M with a past medical history of Stage IV Esophageal CA, (who denies any past medical history, but as per his medical oncologist, he was told he has Stage IV CA and testing was being done to locate the primary.  The primary was just located on upper Endoscopy a few days ago and patient will be informed by his medical oncologist today) presents with 2 weeks of SOB and generalized weakness ongoing x months.  Patient states he's had 2 CT scans of his body (doesn't know of what) in the past 2 weeks.  Had some c/o LLE pain and swelling, so his medical oncologist sent him for a Doppler which reveal LLE clot in the gastrocnemius. Patient also c/o abdominal distention and bloating.  Patient will need paracentesis secondary to large volume symptomatic ascites.

## 2018-10-29 NOTE — CONSULT NOTE ADULT - PROBLEM SELECTOR RECOMMENDATION 2
Pt unaware of his diagnosis as I have not met with him yet in the office to discuss.   He is aware he has cancer of unknown primary that has spread.   I did not discuss diagnosis tonight as pt was in the hallway of the ER and I didn't feel it was appropriate.   Plan to discuss tomorrow afternoon with pt and his wife.   Will consider first dose of chemotherapy with FOLFOX? prior to discharge.   Will f/u on Her 2 testing and send tumor for molecular analysis  Pt may benefit from palliative involvement after I speak to him for help with symptom management

## 2018-10-29 NOTE — H&P ADULT - ASSESSMENT
yes 59 y/o M with likely primary GEJ malignancy with mets to liver and peritoneum presents with PE and DVT.

## 2018-10-29 NOTE — H&P ADULT - PROBLEM SELECTOR PLAN 2
-Ongoing workup to determine primary etiology of metastatic disease found on CT  -MRI with GEJ mass. Will need to discuss with heme/onc if surgical evaluation is needed this hospitalization for biopsy. Patient recently had EGD - will need biopsy results from gastroenterologist as well  -Now with large volume ascites. Patient reports significant discomfort. Will need to consider therapeutic paracentesis tomorrow. Discuss with onc and procedure team.

## 2018-10-29 NOTE — ED PROVIDER NOTE - MEDICAL DECISION MAKING DETAILS
SOB, DVTs, Stage IV esophageal CA-concern for PE with SOB and tachycardia-IV, lab, IVF, CT angio, reassess.

## 2018-10-29 NOTE — CONSULT NOTE ADULT - PROBLEM SELECTOR RECOMMENDATION 9
Agree with Heparin gtt for now. Would continue until paracentesis can be performed.   Post procedure would switch to Lovenox 1.5 mg/kg once per day. If pt can be taught to inject and he does not feel that it is burdensome, Lovenox would be the preferred AC of choice. Alternatively, Xarelto can be continued if pt cannot/does not want to inject. Concerned re lack of subcutaneous tissue for injection.

## 2018-10-29 NOTE — ED ADULT TRIAGE NOTE - CHIEF COMPLAINT QUOTE
pt diagnosed with DVT to Left calf by PMD, sent to ED for anticoagulation therapy. pt c/o SOB. pt noted to appear SOB in triage. also tachycardic, EKG obtained.

## 2018-10-29 NOTE — ED ADULT NURSE REASSESSMENT NOTE - NS ED NURSE REASSESS COMMENT FT1
pt returns from CT scan, VSS, denies any pain, continues to c/o abdominal discomfort, waiting CT results, spouse at bedside, will monitor.

## 2018-10-30 ENCOUNTER — APPOINTMENT (OUTPATIENT)
Dept: ULTRASOUND IMAGING | Facility: IMAGING CENTER | Age: 58
End: 2018-10-30

## 2018-10-30 ENCOUNTER — RESULT REVIEW (OUTPATIENT)
Age: 58
End: 2018-10-30

## 2018-10-30 DIAGNOSIS — I26.99 OTHER PULMONARY EMBOLISM WITHOUT ACUTE COR PULMONALE: ICD-10-CM

## 2018-10-30 DIAGNOSIS — R18.0 MALIGNANT ASCITES: ICD-10-CM

## 2018-10-30 DIAGNOSIS — C16.0 MALIGNANT NEOPLASM OF CARDIA: ICD-10-CM

## 2018-10-30 LAB
ALBUMIN FLD-MCNC: 2.1 G/DL — SIGNIFICANT CHANGE UP
APTT BLD: 28.5 SEC — SIGNIFICANT CHANGE UP (ref 27.5–37.4)
APTT BLD: 50.6 SEC — HIGH (ref 27.5–37.4)
BODY FLUID TYPE: SIGNIFICANT CHANGE UP
BUN SERPL-MCNC: 16 MG/DL — SIGNIFICANT CHANGE UP (ref 7–23)
CALCIUM SERPL-MCNC: 8.7 MG/DL — SIGNIFICANT CHANGE UP (ref 8.4–10.5)
CHLORIDE SERPL-SCNC: 98 MMOL/L — SIGNIFICANT CHANGE UP (ref 98–107)
CLARITY SPEC: SIGNIFICANT CHANGE UP
CO2 SERPL-SCNC: 24 MMOL/L — SIGNIFICANT CHANGE UP (ref 22–31)
COLOR FLD: YELLOW — SIGNIFICANT CHANGE UP
CREAT SERPL-MCNC: 0.52 MG/DL — SIGNIFICANT CHANGE UP (ref 0.5–1.3)
GLUCOSE FLD-MCNC: 73 MG/DL — SIGNIFICANT CHANGE UP
GLUCOSE SERPL-MCNC: 108 MG/DL — HIGH (ref 70–99)
GRAM STN FLD: SIGNIFICANT CHANGE UP
HCT VFR BLD CALC: 41.5 % — SIGNIFICANT CHANGE UP (ref 39–50)
HGB BLD-MCNC: 13.4 G/DL — SIGNIFICANT CHANGE UP (ref 13–17)
LDH SERPL L TO P-CCNC: 240 U/L — SIGNIFICANT CHANGE UP
LYMPHOCYTES NFR FLD: 49 % — SIGNIFICANT CHANGE UP
MACROPHAGES # FLD: 10 % — SIGNIFICANT CHANGE UP
MAGNESIUM SERPL-MCNC: 2.2 MG/DL — SIGNIFICANT CHANGE UP (ref 1.6–2.6)
MCHC RBC-ENTMCNC: 29.8 PG — SIGNIFICANT CHANGE UP (ref 27–34)
MCHC RBC-ENTMCNC: 32.3 % — SIGNIFICANT CHANGE UP (ref 32–36)
MCV RBC AUTO: 92.4 FL — SIGNIFICANT CHANGE UP (ref 80–100)
MONOCYTES # FLD: 38 % — SIGNIFICANT CHANGE UP
NEUTS SEG NFR FLD MANUAL: 3 % — SIGNIFICANT CHANGE UP
NRBC # FLD: 0 — SIGNIFICANT CHANGE UP
PHOSPHATE SERPL-MCNC: 3.5 MG/DL — SIGNIFICANT CHANGE UP (ref 2.5–4.5)
PLATELET # BLD AUTO: 317 K/UL — SIGNIFICANT CHANGE UP (ref 150–400)
PMV BLD: 8.8 FL — SIGNIFICANT CHANGE UP (ref 7–13)
POTASSIUM SERPL-MCNC: 3.9 MMOL/L — SIGNIFICANT CHANGE UP (ref 3.5–5.3)
POTASSIUM SERPL-SCNC: 3.9 MMOL/L — SIGNIFICANT CHANGE UP (ref 3.5–5.3)
PROT FLD-MCNC: 3.9 G/DL — SIGNIFICANT CHANGE UP
RBC # BLD: 4.49 M/UL — SIGNIFICANT CHANGE UP (ref 4.2–5.8)
RBC # FLD: 13.7 % — SIGNIFICANT CHANGE UP (ref 10.3–14.5)
RCV VOL RI: 3000 CELL/UL — HIGH (ref 0–5)
SODIUM SERPL-SCNC: 138 MMOL/L — SIGNIFICANT CHANGE UP (ref 135–145)
SPECIMEN SOURCE: SIGNIFICANT CHANGE UP
TOTAL CELLS COUNTED, BODY FLUID: 100 CELLS — SIGNIFICANT CHANGE UP
TOTAL NUCLEATED CELL COUNT, BODY FLUID: 294 CELL/UL — HIGH (ref 0–5)
WBC # BLD: 6.4 K/UL — SIGNIFICANT CHANGE UP (ref 3.8–10.5)
WBC # FLD AUTO: 6.4 K/UL — SIGNIFICANT CHANGE UP (ref 3.8–10.5)

## 2018-10-30 PROCEDURE — 99232 SBSQ HOSP IP/OBS MODERATE 35: CPT | Mod: GC

## 2018-10-30 PROCEDURE — 99233 SBSQ HOSP IP/OBS HIGH 50: CPT | Mod: 25

## 2018-10-30 PROCEDURE — 49083 ABD PARACENTESIS W/IMAGING: CPT | Mod: GC,59

## 2018-10-30 PROCEDURE — 88112 CYTOPATH CELL ENHANCE TECH: CPT | Mod: 26

## 2018-10-30 PROCEDURE — 88305 TISSUE EXAM BY PATHOLOGIST: CPT | Mod: 26

## 2018-10-30 RX ORDER — HEPARIN SODIUM 5000 [USP'U]/ML
4000 INJECTION INTRAVENOUS; SUBCUTANEOUS ONCE
Qty: 0 | Refills: 0 | Status: DISCONTINUED | OUTPATIENT
Start: 2018-10-30 | End: 2018-10-30

## 2018-10-30 RX ORDER — LIDOCAINE HCL 20 MG/ML
10 VIAL (ML) INJECTION ONCE
Qty: 0 | Refills: 0 | Status: DISCONTINUED | OUTPATIENT
Start: 2018-10-30 | End: 2018-11-08

## 2018-10-30 RX ORDER — HEPARIN SODIUM 5000 [USP'U]/ML
4000 INJECTION INTRAVENOUS; SUBCUTANEOUS EVERY 6 HOURS
Qty: 0 | Refills: 0 | Status: DISCONTINUED | OUTPATIENT
Start: 2018-10-30 | End: 2018-10-31

## 2018-10-30 RX ORDER — HEPARIN SODIUM 5000 [USP'U]/ML
INJECTION INTRAVENOUS; SUBCUTANEOUS
Qty: 25000 | Refills: 0 | Status: DISCONTINUED | OUTPATIENT
Start: 2018-10-30 | End: 2018-10-31

## 2018-10-30 RX ORDER — HEPARIN SODIUM 5000 [USP'U]/ML
2000 INJECTION INTRAVENOUS; SUBCUTANEOUS EVERY 6 HOURS
Qty: 0 | Refills: 0 | Status: DISCONTINUED | OUTPATIENT
Start: 2018-10-30 | End: 2018-10-31

## 2018-10-30 RX ADMIN — Medication 100 MILLIGRAM(S): at 07:10

## 2018-10-30 RX ADMIN — HEPARIN SODIUM 1000 UNIT(S)/HR: 5000 INJECTION INTRAVENOUS; SUBCUTANEOUS at 19:30

## 2018-10-30 RX ADMIN — Medication 650 MILLIGRAM(S): at 03:32

## 2018-10-30 RX ADMIN — SENNA PLUS 2 TABLET(S): 8.6 TABLET ORAL at 21:36

## 2018-10-30 RX ADMIN — Medication 650 MILLIGRAM(S): at 20:30

## 2018-10-30 RX ADMIN — HEPARIN SODIUM 2000 UNIT(S): 5000 INJECTION INTRAVENOUS; SUBCUTANEOUS at 07:10

## 2018-10-30 RX ADMIN — Medication 650 MILLIGRAM(S): at 19:37

## 2018-10-30 RX ADMIN — Medication 100 MILLIGRAM(S): at 18:04

## 2018-10-30 RX ADMIN — HEPARIN SODIUM 4000 UNIT(S): 5000 INJECTION INTRAVENOUS; SUBCUTANEOUS at 19:32

## 2018-10-30 RX ADMIN — HEPARIN SODIUM 1300 UNIT(S)/HR: 5000 INJECTION INTRAVENOUS; SUBCUTANEOUS at 07:10

## 2018-10-30 RX ADMIN — Medication 650 MILLIGRAM(S): at 04:23

## 2018-10-30 NOTE — PROGRESS NOTE ADULT - SUBJECTIVE AND OBJECTIVE BOX
Chief Complaint: metastatic GEJ adenoca    INTERVAL HPI/OVERNIGHT EVENTS: Had a diagnostic para today, 60 ml removed. Unclear why a therapeutic para not performed. Otherwise, trying to eat, although appetite remains poor. Denies any pain. + fatigue. Wife at bedside.     MEDICATIONS  (STANDING):  docusate sodium 100 milliGRAM(s) Oral two times a day  heparin  Infusion.  Unit(s)/Hr (10 mL/Hr) IV Continuous <Continuous>  influenza   Vaccine 0.5 milliLiter(s) IntraMuscular once  lidocaine 1% Injectable 10 milliLiter(s) Local Injection once  senna 2 Tablet(s) Oral at bedtime    MEDICATIONS  (PRN):  acetaminophen   Tablet .. 650 milliGRAM(s) Oral every 6 hours PRN Temp greater or equal to 38C (100.4F), Mild Pain (1 - 3)  heparin  Injectable 4000 Unit(s) IV Push every 6 hours PRN For aPTT less than 40  heparin  Injectable 2000 Unit(s) IV Push every 6 hours PRN For aPTT between 40 - 57  witch hazel Pads 1 Application(s) Topical three times a day PRN hemmorhoidal pain      Allergies    No Known Allergies    Intolerances        ROS: as above     Vital Signs Last 24 Hrs  T(C): 36.8 (31 Oct 2018 06:54), Max: 37 (30 Oct 2018 22:02)  T(F): 98.2 (31 Oct 2018 06:54), Max: 98.6 (30 Oct 2018 22:02)  HR: 106 (31 Oct 2018 06:54) (106 - 110)  BP: 150/90 (31 Oct 2018 06:54) (135/99 - 151/98)  BP(mean): --  RR: 18 (31 Oct 2018 06:54) (18 - 18)  SpO2: 96% (31 Oct 2018 06:54) (96% - 99%)    Physical Exam:   AAO x 3, NAD, cachectic  + temporal wasting   RRR S1S2  CTA b/l   soft distended + fluid wave  trace LE edema b/l    LABS:                        14.8   6.40  )-----------( 351      ( 31 Oct 2018 08:30 )             44.0     10-31    140  |  98  |  16  ----------------------------<  95  3.7   |  27  |  0.57    Ca    9.0      31 Oct 2018 08:30  Phos  3.8     10-31  Mg     2.3     10-31    TPro  6.9  /  Alb  3.2<L>  /  TBili  0.9  /  DBili  x   /  AST  18  /  ALT  13  /  AlkPhos  118  10-29    PT/INR - ( 29 Oct 2018 12:45 )   PT: 12.7 SEC;   INR: 1.10          PTT - ( 31 Oct 2018 08:30 )  PTT:44.6 SEC

## 2018-10-30 NOTE — PROGRESS NOTE ADULT - PROBLEM SELECTOR PLAN 2
I met with pt and wife and discussed the diagnosis and stage.   I explained that treatment would involve palliative chemotherapy with FOLFOX. I generally described the regimen, including need for mediport.   Initially pt stated he would do "whatever was needed" but as we spoke further, he indicated to me that he may want to try alternative therapy first.   I explained to him that the time frame for the effectiveness of chemo may be narrow given his rapid clinical decline. I explained that if he proceeds with alternative therapy first, he may not be a candidate for chemo later should he change his mind.   We also discussed option of focusing on his symptoms with chemotherapy.   He spoke of having a close group of friends and relatives that he wishes to impart his wisdom on, and a desire to leave a legacy. He is very concerned that he may become a burden to his family as his physical condition declines.   He asked me about prognosis with chemotherapy, and I told him an average of 1 yr, although this could be less depending on his tolerability of therapy.   I provided him reading materials on FOLFOX and encouraged him to think about whether or not he may want to proceed and if so, we would then try to place mediport prior to hospital discharge.   Pt will think about his options and let me know what he decides

## 2018-10-30 NOTE — PROGRESS NOTE ADULT - ATTENDING COMMENTS
Hep on hold for paracentesis  Dr Fuenets will speak to patient about Dx  will get pal eval in AM Hep on hold for paracentesis- will b done by preceedure team  Dr Fuentes will speak to patient about Dx  will get pal eval in AM after Dr Fuentes speaks to patient.

## 2018-10-30 NOTE — PHYSICAL THERAPY INITIAL EVALUATION ADULT - PERTINENT HX OF CURRENT PROBLEM, REHAB EVAL
57 y/o M with likely primary GEJ malignancy with mets to liver and peritoneum presents with PE and DVT.

## 2018-10-30 NOTE — PROGRESS NOTE ADULT - PROBLEM SELECTOR PLAN 1
Continue heparin gtt for now, once therapeutic para is complete, can change to Lovenox 1.5 mg/kg daily.   Please send to pharmacy prior to d/c to make sure insurance covers drug

## 2018-10-30 NOTE — PROGRESS NOTE ADULT - SUBJECTIVE AND OBJECTIVE BOX
Patient is a 58y old  Male who presents with a chief complaint of Sent in by oncologist (30 Oct 2018 13:56)      SUBJECTIVE / OVERNIGHT EVENTS:  Patient resting- No  pain.  Very thin man awaiting paracentesis    MEDICATIONS  (STANDING):  docusate sodium 100 milliGRAM(s) Oral two times a day  influenza   Vaccine 0.5 milliLiter(s) IntraMuscular once  lidocaine 1% Injectable 10 milliLiter(s) Local Injection once  senna 2 Tablet(s) Oral at bedtime    MEDICATIONS  (PRN):  acetaminophen   Tablet .. 650 milliGRAM(s) Oral every 6 hours PRN Temp greater or equal to 38C (100.4F), Mild Pain (1 - 3)  witch hazel Pads 1 Application(s) Topical three times a day PRN hemmorhoidal pain        PHYSICAL EXAM:  Vital Signs Last 24 Hrs  T(C): 36.8 (30 Oct 2018 13:33), Max: 37.2 (29 Oct 2018 18:51)  T(F): 98.3 (30 Oct 2018 13:33), Max: 99 (29 Oct 2018 18:51)  HR: 108 (30 Oct 2018 13:33) (102 - 113)  BP: 151/98 (30 Oct 2018 13:33) (137/91 - 151/98)  BP(mean): --  RR: 18 (30 Oct 2018 13:33) (16 - 18)  SpO2: 99% (30 Oct 2018 13:33) (99% - 100%)  GENERAL: NAD, well-developed  HEAD:  Atraumatic, Normocephalic  EYES: EOMI, PERRLA, conjunctiva and sclera - jaundice  NECK: Supple, No JVD  CHEST/LUNG: Clear to auscultation bilaterally; No wheeze  HEART: Regular rate and rhythm; No murmurs, rubs, or gallops  ABDOMEN: Soft, distended with palp mass in pelvic region  EXTREMITIES:  2+ Peripheral Pulses, No clubbing, cyanosis, or edema  PSYCH: Alert  NEUROLOGY: non-focal    LABS:                        13.4   6.40  )-----------( 317      ( 30 Oct 2018 05:30 )             41.5     10-30    138  |  98  |  16  ----------------------------<  108<H>  3.9   |  24  |  0.52    Ca    8.7      30 Oct 2018 05:30  Phos  3.5     10-30  Mg     2.2     10-30    TPro  6.9  /  Alb  3.2<L>  /  TBili  0.9  /  DBili  x   /  AST  18  /  ALT  13  /  AlkPhos  118  10-29    PT/INR - ( 29 Oct 2018 12:45 )   PT: 12.7 SEC;   INR: 1.10          PTT - ( 30 Oct 2018 05:30 )  PTT:50.6 SEC          RADIOLOGY & ADDITIONAL TESTS:    Imaging Personally Reviewed:    Consultant(s) Notes Reviewed:      Care Discussed with Consultants/Other Providers:

## 2018-10-31 DIAGNOSIS — R52 PAIN, UNSPECIFIED: ICD-10-CM

## 2018-10-31 DIAGNOSIS — C15.9 MALIGNANT NEOPLASM OF ESOPHAGUS, UNSPECIFIED: ICD-10-CM

## 2018-10-31 LAB
APTT BLD: 44.6 SEC — HIGH (ref 27.5–37.4)
APTT BLD: 49.1 SEC — HIGH (ref 27.5–37.4)
APTT BLD: 57.6 SEC — HIGH (ref 27.5–36.3)
BUN SERPL-MCNC: 16 MG/DL — SIGNIFICANT CHANGE UP (ref 7–23)
CALCIUM SERPL-MCNC: 9 MG/DL — SIGNIFICANT CHANGE UP (ref 8.4–10.5)
CHLORIDE SERPL-SCNC: 98 MMOL/L — SIGNIFICANT CHANGE UP (ref 98–107)
CO2 SERPL-SCNC: 27 MMOL/L — SIGNIFICANT CHANGE UP (ref 22–31)
CREAT SERPL-MCNC: 0.57 MG/DL — SIGNIFICANT CHANGE UP (ref 0.5–1.3)
CULTURE - ACID FAST SMEAR CONCENTRATED: SIGNIFICANT CHANGE UP
GLUCOSE SERPL-MCNC: 95 MG/DL — SIGNIFICANT CHANGE UP (ref 70–99)
HCT VFR BLD CALC: 40.7 % — SIGNIFICANT CHANGE UP (ref 39–50)
HCT VFR BLD CALC: 44 % — SIGNIFICANT CHANGE UP (ref 39–50)
HGB BLD-MCNC: 13.4 G/DL — SIGNIFICANT CHANGE UP (ref 13–17)
HGB BLD-MCNC: 14.8 G/DL — SIGNIFICANT CHANGE UP (ref 13–17)
MAGNESIUM SERPL-MCNC: 2.3 MG/DL — SIGNIFICANT CHANGE UP (ref 1.6–2.6)
MCHC RBC-ENTMCNC: 29.7 PG — SIGNIFICANT CHANGE UP (ref 27–34)
MCHC RBC-ENTMCNC: 30.8 PG — SIGNIFICANT CHANGE UP (ref 27–34)
MCHC RBC-ENTMCNC: 32.9 % — SIGNIFICANT CHANGE UP (ref 32–36)
MCHC RBC-ENTMCNC: 33.6 % — SIGNIFICANT CHANGE UP (ref 32–36)
MCV RBC AUTO: 90.2 FL — SIGNIFICANT CHANGE UP (ref 80–100)
MCV RBC AUTO: 91.7 FL — SIGNIFICANT CHANGE UP (ref 80–100)
NRBC # FLD: 0 — SIGNIFICANT CHANGE UP
NRBC # FLD: 0 — SIGNIFICANT CHANGE UP
PHOSPHATE SERPL-MCNC: 3.8 MG/DL — SIGNIFICANT CHANGE UP (ref 2.5–4.5)
PLATELET # BLD AUTO: 322 K/UL — SIGNIFICANT CHANGE UP (ref 150–400)
PLATELET # BLD AUTO: 351 K/UL — SIGNIFICANT CHANGE UP (ref 150–400)
PMV BLD: 8.5 FL — SIGNIFICANT CHANGE UP (ref 7–13)
PMV BLD: 8.7 FL — SIGNIFICANT CHANGE UP (ref 7–13)
POTASSIUM SERPL-MCNC: 3.7 MMOL/L — SIGNIFICANT CHANGE UP (ref 3.5–5.3)
POTASSIUM SERPL-SCNC: 3.7 MMOL/L — SIGNIFICANT CHANGE UP (ref 3.5–5.3)
RBC # BLD: 4.51 M/UL — SIGNIFICANT CHANGE UP (ref 4.2–5.8)
RBC # BLD: 4.8 M/UL — SIGNIFICANT CHANGE UP (ref 4.2–5.8)
RBC # FLD: 13.7 % — SIGNIFICANT CHANGE UP (ref 10.3–14.5)
RBC # FLD: 13.9 % — SIGNIFICANT CHANGE UP (ref 10.3–14.5)
SODIUM SERPL-SCNC: 140 MMOL/L — SIGNIFICANT CHANGE UP (ref 135–145)
SPECIMEN SOURCE: SIGNIFICANT CHANGE UP
WBC # BLD: 6.4 K/UL — SIGNIFICANT CHANGE UP (ref 3.8–10.5)
WBC # BLD: 6.88 K/UL — SIGNIFICANT CHANGE UP (ref 3.8–10.5)
WBC # FLD AUTO: 6.4 K/UL — SIGNIFICANT CHANGE UP (ref 3.8–10.5)
WBC # FLD AUTO: 6.88 K/UL — SIGNIFICANT CHANGE UP (ref 3.8–10.5)

## 2018-10-31 PROCEDURE — 99232 SBSQ HOSP IP/OBS MODERATE 35: CPT

## 2018-10-31 PROCEDURE — 99233 SBSQ HOSP IP/OBS HIGH 50: CPT

## 2018-10-31 RX ORDER — OXYCODONE HYDROCHLORIDE 5 MG/1
5 TABLET ORAL EVERY 4 HOURS
Qty: 0 | Refills: 0 | Status: DISCONTINUED | OUTPATIENT
Start: 2018-10-31 | End: 2018-11-05

## 2018-10-31 RX ORDER — ENOXAPARIN SODIUM 100 MG/ML
55 INJECTION SUBCUTANEOUS
Qty: 0 | Refills: 0 | Status: DISCONTINUED | OUTPATIENT
Start: 2018-10-31 | End: 2018-11-01

## 2018-10-31 RX ADMIN — ENOXAPARIN SODIUM 55 MILLIGRAM(S): 100 INJECTION SUBCUTANEOUS at 17:43

## 2018-10-31 RX ADMIN — HEPARIN SODIUM 1100 UNIT(S)/HR: 5000 INJECTION INTRAVENOUS; SUBCUTANEOUS at 02:37

## 2018-10-31 RX ADMIN — OXYCODONE HYDROCHLORIDE 5 MILLIGRAM(S): 5 TABLET ORAL at 13:44

## 2018-10-31 RX ADMIN — HEPARIN SODIUM 2000 UNIT(S): 5000 INJECTION INTRAVENOUS; SUBCUTANEOUS at 02:38

## 2018-10-31 RX ADMIN — OXYCODONE HYDROCHLORIDE 5 MILLIGRAM(S): 5 TABLET ORAL at 14:44

## 2018-10-31 RX ADMIN — Medication 1 TABLET(S): at 13:44

## 2018-10-31 RX ADMIN — HEPARIN SODIUM 2000 UNIT(S): 5000 INJECTION INTRAVENOUS; SUBCUTANEOUS at 09:40

## 2018-10-31 RX ADMIN — HEPARIN SODIUM 1200 UNIT(S)/HR: 5000 INJECTION INTRAVENOUS; SUBCUTANEOUS at 09:37

## 2018-10-31 RX ADMIN — Medication 100 MILLIGRAM(S): at 17:11

## 2018-10-31 RX ADMIN — HEPARIN SODIUM 1200 UNIT(S)/HR: 5000 INJECTION INTRAVENOUS; SUBCUTANEOUS at 15:59

## 2018-10-31 NOTE — PROGRESS NOTE ADULT - ASSESSMENT
59 y/o male with metastatic GE jxn adenocarcinoma (newly dx) admitted with abdominal pain .  Dyspnea sec to new PE-Patient had cta of chest- PE as well as  mets to liver. ? mets to lung as well.  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  Weight loss with FFT      < from: CT Angio Chest w/ IV Cont (10.29.18 @ 14:12) >  IMPRESSION: Multiple bilateral pulmonary arterial emboli as above, some   of which have an eccentric appearance suggesting chronicity.     Small b/l pleural effusions    1.2 cm right lower lobe nodular opacity as on October 17, 2018 PET/CT may   represent a focus of atelectasis however, three-monthfollow-up chest CT   is recommended for further evaluation. 57 y/o male with metastatic GE jxn adenocarcinoma (newly dx) admitted with abdominal pain .  Dyspnea sec to new PE-Patient had cta of chest- PE   as well as  mets to liver. ? mets to lung as well.  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  Weight loss with FFT  Severe protein calorie malnutrition with temple muscle waisting      < from: CT Angio Chest w/ IV Cont (10.29.18 @ 14:12) >  IMPRESSION: Multiple bilateral pulmonary arterial emboli as above, some   of which have an eccentric appearance suggesting chronicity.     Small b/l pleural effusions    1.2 cm right lower lobe nodular opacity as on October 17, 2018 PET/CT may   represent a focus of atelectasis however, three-monthfollow-up chest CT   is recommended for further evaluation.

## 2018-10-31 NOTE — PROGRESS NOTE ADULT - PROBLEM SELECTOR PLAN 1
- pt unsure if he wants to pursue chemotherapy vs herbal treatment (leaning towards the latter)  -please have therapeutic paracentesis performed as ascites is most likely malignant and he remains symptomatic from large volume ascites

## 2018-10-31 NOTE — PROGRESS NOTE ADULT - ATTENDING COMMENTS
d/w procedure team.  POCHUS did not show any pocket for therapeutic paracentesis d/w procedure team.  POCHUS did not show any pocket for therapeutic paracentesis  d/w procedure team [id number]

## 2018-10-31 NOTE — DIETITIAN INITIAL EVALUATION ADULT. - ORAL INTAKE PTA
fair/Patient reported having a fair appetite - eating about 1 meal a day (breakfast). Patient prior to admission drinking different protein shakes for lunch and dinner

## 2018-10-31 NOTE — DIETITIAN INITIAL EVALUATION ADULT. - PERTINENT MEDS FT
MEDICATIONS  (STANDING):  docusate sodium 100 milliGRAM(s) Oral two times a day  heparin  Infusion.  Unit(s)/Hr (10 mL/Hr) IV Continuous <Continuous>  lidocaine 1% Injectable 10 milliLiter(s) Local Injection once  senna 2 Tablet(s) Oral at bedtime    MEDICATIONS  (PRN):  acetaminophen   Tablet .. 650 milliGRAM(s) Oral every 6 hours PRN Temp greater or equal to 38C (100.4F), Mild Pain (1 - 3)  heparin  Injectable 4000 Unit(s) IV Push every 6 hours PRN For aPTT less than 40  heparin  Injectable 2000 Unit(s) IV Push every 6 hours PRN For aPTT between 40 - 57  oxyCODONE    IR 5 milliGRAM(s) Oral every 4 hours PRN moderate and severe pain  witch hazel Pads 1 Application(s) Topical three times a day PRN hemmorhoidal pain

## 2018-10-31 NOTE — DIETITIAN INITIAL EVALUATION ADULT. - PERTINENT LABORATORY DATA
10-31 Na140 mmol/L Glu 95 mg/dL K+ 3.7 mmol/L Cr  0.57 mg/dL BUN 16 mg/dL 10-31 Phos 3.8 mg/dL 10-29 Alb 3.2 g/dL<L>

## 2018-10-31 NOTE — CHART NOTE - NSCHARTNOTEFT_GEN_A_CORE
NUTRITION SERVICES                                                                                  MALNUTRITION ALERT     Attention Health Care Provider: Upon nutritional assessment by the Registered Dietitian your patient was determined to meet criteria / has evidence of the following diagnosis/diagnoses:    [ ] Mild Protein Calorie Malnutrition   [ ] Moderate Protein Calorie Malnutrition   [x] Severe Protein Calorie Malnutrition   [ ] Unspecified Protein Calorie Malnutrition   [ ] Underweight / BMI <19  [ ] Morbid Obesity / BMI >40          PLAN OF CARE: Refer to Initial Dietitian Evaluation or Nutrition Follow-Up Documentation for Nutritional Recommendations.

## 2018-10-31 NOTE — PROGRESS NOTE ADULT - SUBJECTIVE AND OBJECTIVE BOX
Patient is a 58y old  Male who presents with a chief complaint of Sent in by oncologist (31 Oct 2018 10:29)      SUBJECTIVE / OVERNIGHT EVENTS:  patient notes 8/10 lower abd pain.  Patient also noted that Dr Fuentes explained he has cancer from the Esophagus Stomach area with spread to liver.  He is considering getting chemotherapy vs getting holistic medicine with a Dr Shadia Brantley who he listens to on the radio.- he is not sure what he wishes to do.    MEDICATIONS  (STANDING):  docusate sodium 100 milliGRAM(s) Oral two times a day  heparin  Infusion.  Unit(s)/Hr (10 mL/Hr) IV Continuous <Continuous>  lidocaine 1% Injectable 10 milliLiter(s) Local Injection once  senna 2 Tablet(s) Oral at bedtime    MEDICATIONS  (PRN):  acetaminophen   Tablet .. 650 milliGRAM(s) Oral every 6 hours PRN Temp greater or equal to 38C (100.4F), Mild Pain (1 - 3)  heparin  Injectable 4000 Unit(s) IV Push every 6 hours PRN For aPTT less than 40  heparin  Injectable 2000 Unit(s) IV Push every 6 hours PRN For aPTT between 40 - 57  witch hazel Pads 1 Application(s) Topical three times a day PRN hemmorhoidal pain        PHYSICAL EXAM:  Vital Signs Last 24 Hrs  T(C): 36.8 (31 Oct 2018 06:54), Max: 37 (30 Oct 2018 22:02)  T(F): 98.2 (31 Oct 2018 06:54), Max: 98.6 (30 Oct 2018 22:02)  HR: 106 (31 Oct 2018 06:54) (106 - 110)  BP: 150/90 (31 Oct 2018 06:54) (135/99 - 151/98)  BP(mean): --  RR: 18 (31 Oct 2018 06:54) (18 - 18)  SpO2: 96% (31 Oct 2018 06:54) (96% - 99%)  GENERAL: NAD, well-developed  HEAD:  Atraumatic, Normocephalic  EYES: EOMI, PERRLA, conjunctiva and sclera clear  NECK: Supple, No JVD  CHEST/LUNG: Clear to auscultation bilaterally; No wheeze  HEART: Regular rate and rhythm; No murmurs, rubs, or gallops  ABDOMEN: Soft but distended Lower Abdominal pain on palpation- No rebound or gaurding.  EXTREMITIES:  2+ Peripheral Pulses, No clubbing, cyanosis, or edema  PSYCH: AAOx3  NEUROLOGY: non-focal  SKIN: No rashes or lesions    LABS:                        14.8   6.40  )-----------( 351      ( 31 Oct 2018 08:30 )             44.0     10-31    140  |  98  |  16  ----------------------------<  95  3.7   |  27  |  0.57    Ca    9.0      31 Oct 2018 08:30  Phos  3.8     10-31  Mg     2.3     10-31    TPro  6.9  /  Alb  3.2<L>  /  TBili  0.9  /  DBili  x   /  AST  18  /  ALT  13  /  AlkPhos  118  10-29    PT/INR - ( 29 Oct 2018 12:45 )   PT: 12.7 SEC;   INR: 1.10          PTT - ( 31 Oct 2018 08:30 )  PTT:44.6 SEC    RADIOLOGY & ADDITIONAL TESTS:    Imaging Personally Reviewed:    Consultant(s) Notes Reviewed:      Care Discussed with Consultants/Other Providers:  Onc, Procedure team Patient is a 58y old  Male who presents with a chief complaint of Sent in by oncologist (31 Oct 2018 10:29)      SUBJECTIVE / OVERNIGHT EVENTS:  patient notes 8/10 lower abd pain.  Patient also noted that Dr Fuentes explained he has cancer from the Esophagus Stomach area with spread to liver.  He is considering getting chemotherapy vs getting holistic medicine with a Dr Shadia Brantley who he listens to on the radio.- he is not sure what he wishes to do.    MEDICATIONS  (STANDING):  docusate sodium 100 milliGRAM(s) Oral two times a day  heparin  Infusion.  Unit(s)/Hr (10 mL/Hr) IV Continuous <Continuous>  lidocaine 1% Injectable 10 milliLiter(s) Local Injection once  senna 2 Tablet(s) Oral at bedtime    MEDICATIONS  (PRN):  acetaminophen   Tablet .. 650 milliGRAM(s) Oral every 6 hours PRN Temp greater or equal to 38C (100.4F), Mild Pain (1 - 3)  heparin  Injectable 4000 Unit(s) IV Push every 6 hours PRN For aPTT less than 40  heparin  Injectable 2000 Unit(s) IV Push every 6 hours PRN For aPTT between 40 - 57  witch hazel Pads 1 Application(s) Topical three times a day PRN hemmorhoidal pain        PHYSICAL EXAM:  Vital Signs Last 24 Hrs  T(C): 36.8 (31 Oct 2018 06:54), Max: 37 (30 Oct 2018 22:02)  T(F): 98.2 (31 Oct 2018 06:54), Max: 98.6 (30 Oct 2018 22:02)  HR: 106 (31 Oct 2018 06:54) (106 - 110)  BP: 150/90 (31 Oct 2018 06:54) (135/99 - 151/98)  BP(mean): --  RR: 18 (31 Oct 2018 06:54) (18 - 18)  SpO2: 96% (31 Oct 2018 06:54) (96% - 99%)  GENERAL: NAD, well-developed  HEAD:  Atraumatic, Normocephalic  EYES: EOMI, PERRLA, conjunctiva and sclera clear  NECK: Supple, No JVD  CHEST/LUNG: Clear to auscultation bilaterally; No wheeze  HEART: Regular rate and rhythm; No murmurs, rubs, or gallops  ABDOMEN: Soft but distended Lower Abdominal pain on palpation- No rebound or gaurding.  EXTREMITIES:  2+ Peripheral Pulses, No clubbing, cyanosis, or edema  PSYCH: AAOx3  NEUROLOGY: non-focal  SKIN: No rashes or lesions    LABS:                        14.8   6.40  )-----------( 351      ( 31 Oct 2018 08:30 )             44.0     10-31    140  |  98  |  16  ----------------------------<  95  3.7   |  27  |  0.57    Ca    9.0      31 Oct 2018 08:30  Phos  3.8     10-31  Mg     2.3     10-31    TPro  6.9  /  Alb  3.2<L>  /  TBili  0.9  /  DBili  x   /  AST  18  /  ALT  13  /  AlkPhos  118  10-29    PT/INR - ( 29 Oct 2018 12:45 )   PT: 12.7 SEC;   INR: 1.10          PTT - ( 31 Oct 2018 08:30 )  PTT:44.6 SEC    RADIOLOGY & ADDITIONAL TESTS:  < from: CT Angio Chest w/ IV Cont (10.29.18 @ 14:12) >  IMPRESSION: Multiple bilateral pulmonary arterial emboli as above, some   of which have an eccentric appearance suggesting chronicity.     Small b/l pleural effusions    1.2 cm right lower lobe nodular opacity as on October 17, 2018 PET/CT may   represent a focus of atelectasis however, three-monthfollow-up chest CT   is recommended for further evaluation.      < from: CT Abdomen and Pelvis w/ Oral Cont and w/ IV Cont (09.21.18 @ 17:26) >  IMPRESSION:     No CT evidence of bowel perforation.    Hepatic metastases. Omental/peritoneal carcinomatosis.    < end of copied text >        Imaging Personally Reviewed:    Consultant(s) Notes Reviewed:      Care Discussed with Consultants/Other Providers:  Onc, Procedure team

## 2018-10-31 NOTE — DIETITIAN INITIAL EVALUATION ADULT. - OTHER INFO
Patient seen for nutrition consult for unintentional weight loss >10%. Per chart: Patient is a 58 year old male with likely primary GEJ malignancy with mets to liver and peritoneum- presented with PE and DVT. Patient reported having poor appetite, consuming about 20% of meals. Patient denies any nausea/vomiting/diarrhea/constipation or difficulty chewing and swallowing. Patient reports no food allergies or intolerances. Patient reported recent weight loss. Usual weight 155 pounds, current weight: 116 pounds- reported weight loss due to poor appetite.

## 2018-10-31 NOTE — DIETITIAN INITIAL EVALUATION ADULT. - ENERGY NEEDS
Ht: No height documented: Pt stated 67 inches Wt: 116 pounds BMI: 18.1 kg/m2 IBW: 142 pounds(+/-10%) %IBW: 81%     Edema: no edema noted.  Skin: intact, no pressure injuries noted

## 2018-10-31 NOTE — PROGRESS NOTE ADULT - SUBJECTIVE AND OBJECTIVE BOX
Oncology Follow-up    INTERVAL HPI/OVERNIGHT EVENTS:  Patient S&E at bedside. Pt had diagnostic paracentesis yesterday but this is more than likely related to malignancy.  He is eating better and states his pain is controlled     VITAL SIGNS:  T(F): 98.2 (10-31-18 @ 06:54)  HR: 106 (10-31-18 @ 06:54)  BP: 150/90 (10-31-18 @ 06:54)  RR: 18 (10-31-18 @ 06:54)  SpO2: 96% (10-31-18 @ 06:54)  Wt(kg): --    PHYSICAL EXAM:    Constitutional: AAOx3, NAD,   Eyes: PERRL, EOMI, sclera non-icteric  Neck: supple, no masses, no JVD  Respiratory: CTA b/l, good air entry b/l, no wheezing, rhonchi, rales, with normal respiratory effort and no intercostal retractions  Cardiovascular: RRR, normal S1S2, no M/R/G  Gastrointestinal: firm, distended   Extremities:  no c/c/e  Neurological: Grossly intact  Skin: Normal temperature    MEDICATIONS  (STANDING):  docusate sodium 100 milliGRAM(s) Oral two times a day  heparin  Infusion.  Unit(s)/Hr (10 mL/Hr) IV Continuous <Continuous>  influenza   Vaccine 0.5 milliLiter(s) IntraMuscular once  lidocaine 1% Injectable 10 milliLiter(s) Local Injection once  senna 2 Tablet(s) Oral at bedtime    MEDICATIONS  (PRN):  acetaminophen   Tablet .. 650 milliGRAM(s) Oral every 6 hours PRN Temp greater or equal to 38C (100.4F), Mild Pain (1 - 3)  heparin  Injectable 4000 Unit(s) IV Push every 6 hours PRN For aPTT less than 40  heparin  Injectable 2000 Unit(s) IV Push every 6 hours PRN For aPTT between 40 - 57  witch hazel Pads 1 Application(s) Topical three times a day PRN hemmorhoidal pain      No Known Allergies      LABS:                        14.8   6.40  )-----------( 351      ( 31 Oct 2018 08:30 )             44.0     10-30    138  |  98  |  16  ----------------------------<  108<H>  3.9   |  24  |  0.52    Ca    8.7      30 Oct 2018 05:30  Phos  3.5     10-30  Mg     2.2     10-30    TPro  6.9  /  Alb  3.2<L>  /  TBili  0.9  /  DBili  x   /  AST  18  /  ALT  13  /  AlkPhos  118  10-29    PT/INR - ( 29 Oct 2018 12:45 )   PT: 12.7 SEC;   INR: 1.10          PTT - ( 31 Oct 2018 01:20 )  PTT:49.1 SEC       RADIOLOGY & ADDITIONAL TESTS:  Studies reviewed.

## 2018-10-31 NOTE — DIETITIAN INITIAL EVALUATION ADULT. - NS AS NUTRI INTERV FEED ASSISTANCE
Provide feeding assistance and encourage PO intake.  Reviewed menu selections and alternatives to help with caloric and protein intake./Feeding Assistance

## 2018-10-31 NOTE — PROGRESS NOTE ADULT - PROBLEM SELECTOR PLAN 1
- pt unsure if he wants to pursue chemotherapy vs herbal treatment (leaning towards the latter)  - therapeutic paracentesis performed as ascites is most likely malignant - done 10/30/18- procedure team noted No  large volume to remove with POCUS - pt unsure if he wants to pursue chemotherapy vs herbal treatment (leaning towards the latter)  - diagnostic paracentesis performed as ascites is most likely malignant - done 10/30/18- procedure team noted No  large volume to remove with POCUS

## 2018-10-31 NOTE — DIETITIAN INITIAL EVALUATION ADULT. - PHYSICAL APPEARANCE
Nutrition focused physical exam conducted - found signs of malnutrition. Subcutaneous fat loss: [severe] Orbital fat pads region, [severe]Buccal fat region, [severe]Triceps region,  [ ]Ribs region  Muscle wasting: [severe]Temples region, [severe]Clavicle region, [severe]Shoulder region, [ ]Scapula region, [ ]Interosseous region,  [ ]thigh region, [severe]Calf region

## 2018-11-01 ENCOUNTER — TRANSCRIPTION ENCOUNTER (OUTPATIENT)
Age: 58
End: 2018-11-01

## 2018-11-01 LAB
HCT VFR BLD CALC: 41 % — SIGNIFICANT CHANGE UP (ref 39–50)
HGB BLD-MCNC: 13.9 G/DL — SIGNIFICANT CHANGE UP (ref 13–17)
MCHC RBC-ENTMCNC: 31 PG — SIGNIFICANT CHANGE UP (ref 27–34)
MCHC RBC-ENTMCNC: 33.9 % — SIGNIFICANT CHANGE UP (ref 32–36)
MCV RBC AUTO: 91.3 FL — SIGNIFICANT CHANGE UP (ref 80–100)
NRBC # FLD: 0 — SIGNIFICANT CHANGE UP
PLATELET # BLD AUTO: 387 K/UL — SIGNIFICANT CHANGE UP (ref 150–400)
PMV BLD: 9 FL — SIGNIFICANT CHANGE UP (ref 7–13)
RBC # BLD: 4.49 M/UL — SIGNIFICANT CHANGE UP (ref 4.2–5.8)
RBC # FLD: 13.8 % — SIGNIFICANT CHANGE UP (ref 10.3–14.5)
SPECIMEN SOURCE: SIGNIFICANT CHANGE UP
WBC # BLD: 6.33 K/UL — SIGNIFICANT CHANGE UP (ref 3.8–10.5)
WBC # FLD AUTO: 6.33 K/UL — SIGNIFICANT CHANGE UP (ref 3.8–10.5)

## 2018-11-01 PROCEDURE — 99233 SBSQ HOSP IP/OBS HIGH 50: CPT

## 2018-11-01 PROCEDURE — 99232 SBSQ HOSP IP/OBS MODERATE 35: CPT | Mod: GC

## 2018-11-01 RX ORDER — LANOLIN ALCOHOL/MO/W.PET/CERES
3 CREAM (GRAM) TOPICAL ONCE
Qty: 0 | Refills: 0 | Status: COMPLETED | OUTPATIENT
Start: 2018-11-01 | End: 2018-11-01

## 2018-11-01 RX ORDER — ACETAMINOPHEN 500 MG
650 TABLET ORAL ONCE
Qty: 0 | Refills: 0 | Status: COMPLETED | OUTPATIENT
Start: 2018-11-01 | End: 2018-11-01

## 2018-11-01 RX ADMIN — Medication 650 MILLIGRAM(S): at 21:52

## 2018-11-01 RX ADMIN — Medication 3 MILLIGRAM(S): at 01:19

## 2018-11-01 RX ADMIN — ENOXAPARIN SODIUM 55 MILLIGRAM(S): 100 INJECTION SUBCUTANEOUS at 06:21

## 2018-11-01 RX ADMIN — AER TRAVELER 1 APPLICATION(S): 0.5 SOLUTION RECTAL; TOPICAL at 13:03

## 2018-11-01 RX ADMIN — OXYCODONE HYDROCHLORIDE 5 MILLIGRAM(S): 5 TABLET ORAL at 14:03

## 2018-11-01 RX ADMIN — Medication 650 MILLIGRAM(S): at 22:50

## 2018-11-01 RX ADMIN — OXYCODONE HYDROCHLORIDE 5 MILLIGRAM(S): 5 TABLET ORAL at 13:03

## 2018-11-01 RX ADMIN — Medication 1 TABLET(S): at 13:00

## 2018-11-01 NOTE — DISCHARGE NOTE ADULT - INSTRUCTIONS
regular with ensure Regular with ensure Meds reviewed with patient and wife made aware of reason for taking meds when to take and when next dose is due,

## 2018-11-01 NOTE — PROGRESS NOTE ADULT - ASSESSMENT
59 y/o male with metastatic GE jxn adenocarcinoma (newly dx) admitted with abdominal pain .  Dyspnea sec to new PE-Patient had cta of chest- PE   as well as  mets to liver. ? mets to lung as well.  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  Weight loss with FFT  Severe protein calorie malnutrition with temple muscle waisting      < from: CT Angio Chest w/ IV Cont (10.29.18 @ 14:12) >  IMPRESSION: Multiple bilateral pulmonary arterial emboli as above, some   of which have an eccentric appearance suggesting chronicity.     Small b/l pleural effusions    1.2 cm right lower lobe nodular opacity as on October 17, 2018 PET/CT may   represent a focus of atelectasis however, three-monthfollow-up chest CT   is recommended for further evaluation.

## 2018-11-01 NOTE — DISCHARGE NOTE ADULT - PLAN OF CARE
[facility]12 had therapeutic  paracentesis Your esophageal cancer was found to likely have progressed and you had new fluid (Ascites) in your abdomen which was drained. You also had a port placed by IR for chemotherapy. You are to follow-up with your oncologist in the next 1-2 weeks to discuss further treatment options such as chemotherapy and need for further outpatient paracentesis with Interventional radiology. Please return for worsening abdominal distention, persistent fevers>2-3 days, worsening abdominal pain, or for any other concerns. Continue a regular diet supplemented with Ensure You should continue a regular diet and supplement with ensure. Follow-up with Oncologist Your esophageal cancer was found to likely have progressed and you had new fluid (Ascites) in your abdomen which was drained. You also had a port placed by IR for chemotherapy. You are to follow-up with your oncologist in the next 1-2 weeks to discuss further treatment options such as chemotherapy. Please return for worsening abdominal distention, persistent fevers>2-3 days, worsening abdominal pain, or for any other concerns. Continue Anticoagulation treatment You were seen at [facility] for a Pulmonary embolism (Blood clot in your lungs). You were started on a blood thinner called Lovenox. You are to continue the Lovenox twice a day as prescribed above. You are to follow-up with your hematologist in the next 1-2 weeks for further evaluation and treatment. You are to return if you experience worsening shortness of breath, worsening lower extremity edema, chest pain that is worse with breathing or non remitting chest pain, or for any other concerns. Follow-up with your Oncologist You were seen at [facility] for a Pulmonary embolism (Blood clot in your lungs). You were started on a blood thinner called Lovenox. You are to continue the Lovenox once a day as prescribed above. You are to follow-up with your hematologist in the next 1-2 weeks for further evaluation and treatment. You are to return if you experience worsening shortness of breath, worsening lower extremity edema, chest pain that is worse with breathing or non remitting chest pain, or for any other concerns. You were educated and demonstrated how to properly self-administer subcutaneous Lovenox. Your esophageal cancer was found to likely have progressed and you had new fluid (Ascites) in your abdomen which was drained twice while inpatient. You also had a port placed by IR for chemotherapy. You are to follow-up with your oncologist in the next 1-2 weeks to discuss further treatment options such as chemotherapy and need for further outpatient paracentesis with Interventional radiology. Please return for worsening abdominal distention, persistent fevers>2-3 days, worsening abdominal pain, or for any other concerns.

## 2018-11-01 NOTE — PROGRESS NOTE ADULT - PROBLEM SELECTOR PLAN 1
-pt agreeable to chemotherapy , please arrange for mediport placement  -remains distended, please get diagnostic US and if large ascites please get IR to perform paracentesis  -plan d/w primary team

## 2018-11-01 NOTE — PROGRESS NOTE ADULT - SUBJECTIVE AND OBJECTIVE BOX
Patient is a 58y old  Male who presents with a chief complaint of Sent in by oncologist (01 Nov 2018 10:12)      SUBJECTIVE / OVERNIGHT EVENTS:  Long discussion.    MEDICATIONS  (STANDING):  docusate sodium 100 milliGRAM(s) Oral two times a day  enoxaparin Injectable 55 milliGRAM(s) SubCutaneous two times a day  lidocaine 1% Injectable 10 milliLiter(s) Local Injection once  multivitamin 1 Tablet(s) Oral daily  senna 2 Tablet(s) Oral at bedtime    MEDICATIONS  (PRN):  acetaminophen   Tablet .. 650 milliGRAM(s) Oral every 6 hours PRN Temp greater or equal to 38C (100.4F), Mild Pain (1 - 3)  oxyCODONE    IR 5 milliGRAM(s) Oral every 4 hours PRN moderate and severe pain  witch hazel Pads 1 Application(s) Topical three times a day PRN hemmorhoidal john paul          PHYSICAL EXAM:  Vital Signs Last 24 Hrs  T(C): 36.9 (01 Nov 2018 06:49), Max: 37 (31 Oct 2018 13:43)  T(F): 98.5 (01 Nov 2018 06:49), Max: 98.6 (31 Oct 2018 13:43)  HR: 113 (01 Nov 2018 06:49) (106 - 113)  BP: 136/100 (01 Nov 2018 06:49) (136/100 - 150/90)  BP(mean): --  RR: 18 (01 Nov 2018 06:49) (18 - 18)  SpO2: 98% (01 Nov 2018 06:49) (98% - 100%)  GENERAL: Thin Male  HEAD:  Atraumatic, Normocephalic  EYES: EOMI, PERRLA, conjunctiva and sclera clear  NECK: Supple, No JVD  CHEST/LUNG: Clear to auscultation bilaterally; No wheeze  HEART: Regular rate and rhythm; No murmurs, rubs, or gallops  ABDOMEN: Soft, Nontender, Nondistended; Bowel sounds present  EXTREMITIES:  2+ Peripheral Pulses, No clubbing, cyanosis, or edema  PSYCH: AAOx3  NEUROLOGY: non-focal  SKIN: No rashes or lesions    LABS:                        13.9   6.33  )-----------( 387      ( 01 Nov 2018 06:50 )             41.0     10-31    140  |  98  |  16  ----------------------------<  95  3.7   |  27  |  0.57    Ca    9.0      31 Oct 2018 08:30  Phos  3.8     10-31  Mg     2.3     10-31      PTT - ( 31 Oct 2018 15:27 )  PTT:57.6 SEC          RADIOLOGY & ADDITIONAL TESTS:    Imaging Personally Reviewed:    Consultant(s) Notes Reviewed:      Care Discussed with Consultants/Other Providers:

## 2018-11-01 NOTE — DISCHARGE NOTE ADULT - ADDITIONAL INSTRUCTIONS
Please follow up at [facility] with Dr Fuentes for treatment in 1 week Please follow up at [facility]  with Dr Fuentes within 1 week of discharge, please call [phone] to confirm appointment   Please follow up with your PCP within 1 week of discharge from hospital

## 2018-11-01 NOTE — DISCHARGE NOTE ADULT - HOSPITAL COURSE
59 y/o M sent in by oncologist for evaluation and treatment of PE. Patient reports that since July he has lost 40lbs due to decreased appetite and abdominal bloating and distention. He states that he underwent CT which showed irregularity in his sigmoid colon for which he underwent colonoscopy and pathology from that was negative. He also underwent EGD last week and as far as he knows it was normal. 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 Patient had reported to his oncologist that he had some pain in his left leg for which dopplers were performed. Dopplers were positive for DVT and patient was started on xarelto. Patient reports he has been taking xarelto for only 2 days. Today he spoke to his oncologist and reported that he has also had SOB recently. Patient was sent in to be evaluated for PE. Today he denies any chest pain, acute SOB, LE edema or swelling. He does report worsening abdominal distention and discomfort.     HOSPITAL COURSE: 59 y/o M sent in by oncologist for evaluation and treatment of PE. Patient reports that since July he has lost 40lbs due to decreased appetite and abdominal bloating and distention. He states that he underwent CT which showed irregularity in his sigmoid colon for which he underwent colonoscopy and pathology from that was negative. He also underwent EGD last week and as far as he knows it was normal. 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 Patient had reported to his oncologist that he had some pain in his left leg for which dopplers were performed. Dopplers were positive for DVT and patient was started on xarelto. Patient reports he has been taking xarelto for only 2 days. Today he spoke to his oncologist and reported that he has also had SOB recently. Patient was sent in to be evaluated for PE. Today he denies any chest pain, acute SOB, LE edema or swelling. He does report worsening abdominal distention and discomfort.     HOSPITAL COURSE:  Patient was seen by Oncology and Palliative care .  Patient was rec to have paracentesis Preprocedure Pt is a 57 y/o M sent in by oncologist for evaluation and treatment of PE. Patient reports that since July he has lost 40lbs due to decreased appetite and abdominal bloating and distention. He states that he underwent CT which showed irregularity in his sigmoid colon for which he underwent colonoscopy and pathology from that was negative. He also underwent EGD last week and as far as he knows it was normal. 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 Patient had reported to his oncologist that he had some pain in his left leg for which dopplers were performed. Dopplers were positive for DVT and patient was started on xarelto. Patient reports he has been taking xarelto for only 2 days. Today he spoke to his oncologist and reported that he has also had SOB recently. Patient was sent in to be evaluated for PE. Today he denies any chest pain, acute SOB, LE edema or swelling. He does report worsening abdominal distention and discomfort.     HOSPITAL COURSE:  Patient was seen by Oncology and discussed further chemotherapy which patient was unsure he wanted but after discussion patient was amenable to chemotherapy and port was placed. patient also had an abdominal paracentesis for new ascites likely in the setting of malignancy with minimal relief. Patient was discharge home with follow-up with Oncology for further discussion and beginning of chemotherapy and for potential further outpatient paracentesis. Pt is a 59 y/o M sent in by oncologist for evaluation and treatment of PE. Patient reports that since July he has lost 40lbs due to decreased appetite and abdominal bloating and distention. He states that he underwent CT which showed irregularity in his sigmoid colon for which he underwent colonoscopy and pathology from that was negative. He also underwent EGD last week and as far as he knows it was normal. 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 Patient had reported to his oncologist that he had some pain in his left leg for which dopplers were performed. Dopplers were positive for DVT and patient was started on xarelto. Patient reports he has been taking xarelto for only 2 days. Today he spoke to his oncologist and reported that he has also had SOB recently. Patient was sent in to be evaluated for PE. Today he denies any chest pain, acute SOB, LE edema or swelling. He does report worsening abdominal distention and discomfort.     HOSPITAL COURSE:  Patient was seen by Oncology and discussed further chemotherapy which patient was unsure he wanted but after discussion patient was amenable to chemotherapy and port was placed. patient also had an abdominal paracentesis for new ascites likely in the setting of malignancy with minimal relief. Patient was discharge home with follow-up with Oncology for further discussion and beginning of chemotherapy and for potential further outpatient paracentesis.   He was treated with Lovenox for PE.  Lovenox was held for paracentesis first by proceedure team- No CP /sob SBP  and then by IR at the same time port was placed for chemo.  Patient noted acute on chronic pain req pain control with MS contin 15 mg q12 and Oxy ir inc from 5 to 10 mg q6 prn.  He decided he wanted curtis- FOLFOX chemotherapy was given 11/5- 11/7.  He had fluid ascitis fluid accumulation agin with chemo and last paracentesis done 11/8/19 before home. Pt is a 59 y/o M sent in by oncologist for evaluation and treatment of PE. Patient reports that since July he has lost 40lbs due to decreased appetite and abdominal bloating and distention. He states that he underwent CT which showed irregularity in his sigmoid colon for which he underwent colonoscopy and pathology from that was negative. He also underwent EGD last week and as far as he knows it was normal. 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 Patient had reported to his oncologist that he had some pain in his left leg for which dopplers were performed. Dopplers were positive for DVT and patient was started on xarelto. Patient reports he has been taking xarelto for only 2 days. Today he spoke to his oncologist and reported that he has also had SOB recently. Patient was sent in to be evaluated for PE. Today he denies any chest pain, acute SOB, LE edema or swelling. He does report worsening abdominal distention and discomfort.     HOSPITAL COURSE:  Patient was seen by Oncology and discussed further chemotherapy which patient was unsure he wanted but after discussion patient was amenable to chemotherapy and port was placed. patient also had an abdominal paracentesis for new ascites likely in the setting of malignancy with minimal relief. Patient was discharge home with follow-up with Oncology for further discussion and beginning of chemotherapy and for potential further outpatient paracentesis.   He was treated with Lovenox for PE--> patient was properly instructed how to self administer.   Lovenox was held for paracentesis first by procedure team- No CP /sob SBP  and then by IR at the same time port was placed for chemo.  Patient noted acute on chronic pain req pain control with MS contin 15 mg q12 and Oxy ir inc from 5 to 10 mg q6 prn.  He decided he wanted chemo- FOLFOX chemotherapy was given 11/5- 11/7.  He had fluid ascites fluid accumulation again with chemo and last paracentesis done 11/8/19 before home.  ISTOP: ref number: [id number] Pt is a 59 y/o M sent in by oncologist for evaluation and treatment of PE. Patient reports that since July he has lost 40lbs due to decreased appetite and abdominal bloating and distention. He states that he underwent CT which showed irregularity in his sigmoid colon for which he underwent colonoscopy and pathology from that was negative. He also underwent EGD last week and as far as he knows it was normal. 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 Patient had reported to his oncologist that he had some pain in his left leg for which dopplers were performed. Dopplers were positive for DVT and patient was started on xarelto. Patient reports he has been taking xarelto for only 2 days. Today he spoke to his oncologist and reported that he has also had SOB recently. Patient was sent in to be evaluated for PE. Today he denies any chest pain, acute SOB, LE edema or swelling. He does report worsening abdominal distention and discomfort.     HOSPITAL COURSE:  Patient was seen by Oncology and discussed further chemotherapy which patient was unsure he wanted but after discussion patient was amenable to chemotherapy and port was placed. patient also had an abdominal paracentesis for new ascites likely in the setting of malignancy with minimal relief. Patient was discharge home with follow-up with Oncology for further discussion and beginning of chemotherapy and for potential further outpatient paracentesis.   He was treated with Lovenox for PE--> patient was properly instructed how to self administer.   Lovenox was held for paracentesis first by procedure team- No CP /sob SBP  and then by IR at the same time port was placed for chemo.  Patient noted acute on chronic pain req pain control with MS contin 15 mg q12 and Oxy ir inc from 5 to 10 mg q6 prn.  He decided he wanted chemo- FOLFOX chemotherapy was given 11/5- 11/7.  He had fluid ascites fluid accumulation again with chemo and last paracentesis done 11/8/19 before home.  CM to set up home care services.   ISTOP: ref number: [id number]

## 2018-11-01 NOTE — PROGRESS NOTE ADULT - PROBLEM SELECTOR PLAN 1
- pt unsure if he wants to pursue chemotherapy vs herbal treatment (leaning towards the latter)  - diagnostic paracentesis performed as ascites is most likely malignant - done 10/30/18- procedure team noted No  large volume to remove with POCUS

## 2018-11-01 NOTE — DISCHARGE NOTE ADULT - MEDICATION SUMMARY - MEDICATIONS TO TAKE
I will START or STAY ON the medications listed below when I get home from the hospital:    Hospital Bed  -- Indication: For Gastroesophageal cancer    acetaminophen 325 mg oral tablet  -- 2 tab(s) by mouth every 6 hours, As needed, Temp greater or equal to 38C (100.4F), Mild Pain (1 - 3)  -- Indication: For fever/ pain     morphine 15 mg/8 to 12 hr oral tablet, extended release  -- 1 tab(s) by mouth every 12 hours MDD:2 tab, hold for oversedation/lethargy   -- Indication: For cancer related pain     oxyCODONE 10 mg oral tablet  -- 1 tab(s) by mouth every 6 hours, As Needed -Moderate Pain (4 - 6) and severe pain MDD:4 tab, hold for oversedation/lethargy   -- Indication: For cancer related pain     Lovenox 80 mg/0.8 mL injectable solution  -- 80 milligram(s) subcutaneously once a day MDD:80 mg  -- It is very important that you take or use this exactly as directed.  Do not skip doses or discontinue unless directed by your doctor.    -- Indication: For Pulmonary embolism    senna oral tablet  -- 2 tab(s) by mouth once a day (at bedtime)  -- Indication: For constipation     docusate sodium 100 mg oral capsule  -- 1 cap(s) by mouth 2 times a day  -- Indication: For constipation     polyethylene glycol 3350 oral powder for reconstitution  -- 17 gram(s) by mouth 2 times a day  -- Indication: For constipation     Multiple Vitamins oral tablet  -- 1 tab(s) by mouth once a day  -- Indication: For Multivitamin

## 2018-11-01 NOTE — DISCHARGE NOTE ADULT - MEDICATION SUMMARY - MEDICATIONS TO STOP TAKING
I will STOP taking the medications listed below when I get home from the hospital:    Xarelto 15 mg oral tablet  -- 1 tab(s) by mouth 2 times a day

## 2018-11-01 NOTE — DISCHARGE NOTE ADULT - CARE PLAN
Principal Discharge DX:	Esophageal cancer, stage IV  Assessment and plan of treatment:	Port placed by IR for chemotherapy  Secondary Diagnosis:	Pulmonary embolism  Assessment and plan of treatment:	c/w Lovenox  Secondary Diagnosis:	Malignant ascites  Secondary Diagnosis:	Severe protein-calorie malnutrition  Assessment and plan of treatment:	cw ensure Principal Discharge DX:	Esophageal cancer, stage IV  Assessment and plan of treatment:	Port placed by IR for chemotherapy  Secondary Diagnosis:	Pulmonary embolism  Assessment and plan of treatment:	c/w Lovenox q12  Secondary Diagnosis:	Malignant ascites  Assessment and plan of treatment:	had therapeutic  paracentesis  Secondary Diagnosis:	Severe protein-calorie malnutrition  Assessment and plan of treatment:	cw ensure Principal Discharge DX:	Esophageal cancer, stage IV  Goal:	Follow-up with Oncologist  Assessment and plan of treatment:	Your esophageal cancer was found to likely have progressed and you had new fluid (Ascites) in your abdomen which was drained. You also had a port placed by IR for chemotherapy. You are to follow-up with your oncologist in the next 1-2 weeks to discuss further treatment options such as chemotherapy. Please return for worsening abdominal distention, persistent fevers>2-3 days, worsening abdominal pain, or for any other concerns.  Secondary Diagnosis:	Pulmonary embolism  Goal:	Continue Anticoagulation treatment  Assessment and plan of treatment:	You were seen at [facility] for a Pulmonary embolism (Blood clot in your lungs). You were started on a blood thinner called Lovenox. You are to continue the Lovenox twice a day as prescribed above. You are to follow-up with your hematologist in the next 1-2 weeks for further evaluation and treatment. You are to return if you experience worsening shortness of breath, worsening lower extremity edema, chest pain that is worse with breathing or non remitting chest pain, or for any other concerns.  Secondary Diagnosis:	Malignant ascites  Goal:	Follow-up with your Oncologist  Assessment and plan of treatment:	Your esophageal cancer was found to likely have progressed and you had new fluid (Ascites) in your abdomen which was drained. You also had a port placed by IR for chemotherapy. You are to follow-up with your oncologist in the next 1-2 weeks to discuss further treatment options such as chemotherapy and need for further outpatient paracentesis with Interventional radiology. Please return for worsening abdominal distention, persistent fevers>2-3 days, worsening abdominal pain, or for any other concerns.  Secondary Diagnosis:	Severe protein-calorie malnutrition  Goal:	Continue a regular diet supplemented with Ensure  Assessment and plan of treatment:	You should continue a regular diet and supplement with ensure. Principal Discharge DX:	Esophageal cancer, stage IV  Goal:	Follow-up with Oncologist  Assessment and plan of treatment:	Your esophageal cancer was found to likely have progressed and you had new fluid (Ascites) in your abdomen which was drained. You also had a port placed by IR for chemotherapy. You are to follow-up with your oncologist in the next 1-2 weeks to discuss further treatment options such as chemotherapy. Please return for worsening abdominal distention, persistent fevers>2-3 days, worsening abdominal pain, or for any other concerns.  Secondary Diagnosis:	Pulmonary embolism  Goal:	Continue Anticoagulation treatment  Assessment and plan of treatment:	You were seen at [facility] for a Pulmonary embolism (Blood clot in your lungs). You were started on a blood thinner called Lovenox. You are to continue the Lovenox once a day as prescribed above. You are to follow-up with your hematologist in the next 1-2 weeks for further evaluation and treatment. You are to return if you experience worsening shortness of breath, worsening lower extremity edema, chest pain that is worse with breathing or non remitting chest pain, or for any other concerns. You were educated and demonstrated how to properly self-administer subcutaneous Lovenox.  Secondary Diagnosis:	Malignant ascites  Goal:	Follow-up with your Oncologist  Assessment and plan of treatment:	Your esophageal cancer was found to likely have progressed and you had new fluid (Ascites) in your abdomen which was drained twice while inpatient. You also had a port placed by IR for chemotherapy. You are to follow-up with your oncologist in the next 1-2 weeks to discuss further treatment options such as chemotherapy and need for further outpatient paracentesis with Interventional radiology. Please return for worsening abdominal distention, persistent fevers>2-3 days, worsening abdominal pain, or for any other concerns.  Secondary Diagnosis:	Severe protein-calorie malnutrition  Goal:	Continue a regular diet supplemented with Ensure  Assessment and plan of treatment:	You should continue a regular diet and supplement with ensure.

## 2018-11-01 NOTE — CHART NOTE - NSCHARTNOTEFT_GEN_A_CORE
Pre-Interventional Radiology Procedure Note    [id number] yo     mmanya    Procedure: Paracentesis/ Mediport Placement for Chemotherapy  Diagnosis/Indication: Patient is a 58y old  Male who presents with a chief complaint of Sent in by oncologist (01 Nov 2018 10:35)      Interventional Radiology Attending Physician: Dr. Agarwal    Ordering Attending Physician:Dr. Montana    PAST MEDICAL & SURGICAL HISTORY:  Esophageal cancer, stage IV  History of appendectomy       CBC Full  -  ( 01 Nov 2018 06:50 )  WBC Count : 6.33 K/uL  Hemoglobin : 13.9 g/dL  Hematocrit : 41.0 %  Platelet Count - Automated : 387 K/uL  Mean Cell Volume : 91.3 fL  Mean Cell Hemoglobin : 31.0 pg  Mean Cell Hemoglobin Concentration : 33.9 %  Auto Neutrophil # : x  Auto Lymphocyte # : x  Auto Monocyte # : x  Auto Eosinophil # : x  Auto Basophil # : x  Auto Neutrophil % : x  Auto Lymphocyte % : x  Auto Monocyte % : x  Auto Eosinophil % : x  Auto Basophil % : x    10-31    140  |  98  |  16  ----------------------------<  95  3.7   |  27  |  0.57    Ca    9.0      31 Oct 2018 08:30  Phos  3.8     10-31  Mg     2.3     10-31      PTT - ( 31 Oct 2018 15:27 )  PTT:57.6 SEC    Patient aware and able to sign consent.

## 2018-11-01 NOTE — DISCHARGE NOTE ADULT - CARE PROVIDERS DIRECT ADDRESSES
[email or web address]

## 2018-11-01 NOTE — PROGRESS NOTE ADULT - SUBJECTIVE AND OBJECTIVE BOX
Oncology Follow-up    INTERVAL HPI/OVERNIGHT EVENTS:  Patient S&E at bedside. No o/n events, still has abdominal pain and tightness, procedure team reportedly only saw a small pocket for paracentesis    VITAL SIGNS:  T(F): 98 (11-01-18 @ 15:07)  HR: 110 (11-01-18 @ 15:07)  BP: 142/100 (11-01-18 @ 15:07)  RR: 17 (11-01-18 @ 15:07)  SpO2: 99% (11-01-18 @ 15:07)  Wt(kg): --    PHYSICAL EXAM:    Constitutional: AAOx3, NAD,   Eyes: PERRL, EOMI, sclera non-icteric  Neck: supple, no masses, no JVD  Respiratory: CTA b/l, good air entry b/l, no wheezing, rhonchi, rales, with normal respiratory effort and no intercostal retractions  Cardiovascular: RRR, normal S1S2, no M/R/G  Gastrointestinal: distended and firm   Extremities:  no c/c/e  Neurological: Grossly intact  Skin: Normal temperature    MEDICATIONS  (STANDING):  docusate sodium 100 milliGRAM(s) Oral two times a day  lidocaine 1% Injectable 10 milliLiter(s) Local Injection once  multivitamin 1 Tablet(s) Oral daily  senna 2 Tablet(s) Oral at bedtime    MEDICATIONS  (PRN):  acetaminophen   Tablet .. 650 milliGRAM(s) Oral every 6 hours PRN Temp greater or equal to 38C (100.4F), Mild Pain (1 - 3)  oxyCODONE    IR 5 milliGRAM(s) Oral every 4 hours PRN moderate and severe pain  witch hazel Pads 1 Application(s) Topical three times a day PRN hemmorhoidal pain      No Known Allergies      LABS:                        13.9   6.33  )-----------( 387      ( 01 Nov 2018 06:50 )             41.0     10-31    140  |  98  |  16  ----------------------------<  95  3.7   |  27  |  0.57    Ca    9.0      31 Oct 2018 08:30  Phos  3.8     10-31  Mg     2.3     10-31      PTT - ( 31 Oct 2018 15:27 )  PTT:57.6 SEC       RADIOLOGY & ADDITIONAL TESTS:  Studies reviewed.

## 2018-11-01 NOTE — DISCHARGE NOTE ADULT - REASON FOR ADMISSION
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

## 2018-11-01 NOTE — DISCHARGE NOTE ADULT - PATIENT PORTAL LINK FT
You can access the [facility] Patient Portal, offered by [facility], by registering with the following website: [email or web address]

## 2018-11-01 NOTE — DISCHARGE NOTE ADULT - CARE PROVIDER_API CALL
Shahrzad Fuentes (DO), HematologyOncology; Internal Medicine  [address] [place] [address]  Phone: [phone]  Fax: [phone] Shahrzad Fuentes (DO), HematologyOncology; Internal Medicine  [address] [place] [address]  Phone: [phone]  Fax: [phone]    Mariela Leal), [facility] Medicine; Internal Medicine  [address] [place] [address]  Phone: [phone]  Fax: [phone]

## 2018-11-01 NOTE — PROGRESS NOTE ADULT - ATTENDING COMMENTS
Patient wants to Get Chemo-therapy with Dr Fuentes at [facility].  Patient needs how to learn how to give himself Lovenox.  Patient agrees to make wife HCP - wife is Angelique  [phone]  Called Angelique and explained hospital course.  Patient and wife understands plan for HOME 11/2/18 after training of Lovenox Patient wants to Get Chemo-therapy with Dr Fuentes at [facility].  Patient needs how to learn how to give himself Lovenox.  Patient agrees to make wife HCP - wife is Angelique  [phone]  Called Angelique and explained hospital course.  Patient and wife understands plan for HOME 11/2/18 after training of Lovenox    Addendum 11/1/18  2 nd touch  Spoke with Dr duckworth in IR - they will place port and do paracenteses 11/2/18.  c/w holding Lovenox.  IR  rec restarting Lovenox 11/3/18  d/c planning for 11/3/18  Patient understands.

## 2018-11-01 NOTE — DISCHARGE NOTE ADULT - HOME CARE AGENCY
Patient to d/c home 11/8 with an accepted referral to [facility] care  for RN, Home Aide Jeane Patient to d/c home 11/8 with an accepted referral to [facility] care  for RN, Home Aide Eval SOC 11/9/18

## 2018-11-02 ENCOUNTER — APPOINTMENT (OUTPATIENT)
Dept: HEMATOLOGY ONCOLOGY | Facility: CLINIC | Age: 58
End: 2018-11-02

## 2018-11-02 LAB
APTT BLD: 28.5 SEC — SIGNIFICANT CHANGE UP (ref 27.5–36.3)
BUN SERPL-MCNC: 15 MG/DL — SIGNIFICANT CHANGE UP (ref 7–23)
CALCIUM SERPL-MCNC: 8.8 MG/DL — SIGNIFICANT CHANGE UP (ref 8.4–10.5)
CHLORIDE SERPL-SCNC: 96 MMOL/L — LOW (ref 98–107)
CO2 SERPL-SCNC: 26 MMOL/L — SIGNIFICANT CHANGE UP (ref 22–31)
CREAT SERPL-MCNC: 0.5 MG/DL — SIGNIFICANT CHANGE UP (ref 0.5–1.3)
GLUCOSE SERPL-MCNC: 100 MG/DL — HIGH (ref 70–99)
HCT VFR BLD CALC: 41.6 % — SIGNIFICANT CHANGE UP (ref 39–50)
HGB BLD-MCNC: 13.6 G/DL — SIGNIFICANT CHANGE UP (ref 13–17)
INR BLD: 1.05 — SIGNIFICANT CHANGE UP (ref 0.88–1.17)
MCHC RBC-ENTMCNC: 29.7 PG — SIGNIFICANT CHANGE UP (ref 27–34)
MCHC RBC-ENTMCNC: 32.7 % — SIGNIFICANT CHANGE UP (ref 32–36)
MCV RBC AUTO: 90.8 FL — SIGNIFICANT CHANGE UP (ref 80–100)
NRBC # FLD: 0 — SIGNIFICANT CHANGE UP
PLATELET # BLD AUTO: 374 K/UL — SIGNIFICANT CHANGE UP (ref 150–400)
PMV BLD: 8.5 FL — SIGNIFICANT CHANGE UP (ref 7–13)
POTASSIUM SERPL-MCNC: 4 MMOL/L — SIGNIFICANT CHANGE UP (ref 3.5–5.3)
POTASSIUM SERPL-SCNC: 4 MMOL/L — SIGNIFICANT CHANGE UP (ref 3.5–5.3)
PROTHROM AB SERPL-ACNC: 12 SEC — SIGNIFICANT CHANGE UP (ref 9.8–13.1)
RBC # BLD: 4.58 M/UL — SIGNIFICANT CHANGE UP (ref 4.2–5.8)
RBC # FLD: 13.8 % — SIGNIFICANT CHANGE UP (ref 10.3–14.5)
SODIUM SERPL-SCNC: 138 MMOL/L — SIGNIFICANT CHANGE UP (ref 135–145)
WBC # BLD: 5.68 K/UL — SIGNIFICANT CHANGE UP (ref 3.8–10.5)
WBC # FLD AUTO: 5.68 K/UL — SIGNIFICANT CHANGE UP (ref 3.8–10.5)

## 2018-11-02 PROCEDURE — 36561 INSERT TUNNELED CV CATH: CPT

## 2018-11-02 PROCEDURE — 76705 ECHO EXAM OF ABDOMEN: CPT | Mod: 26

## 2018-11-02 PROCEDURE — 77001 FLUOROGUIDE FOR VEIN DEVICE: CPT | Mod: 26,GC,59

## 2018-11-02 PROCEDURE — 49083 ABD PARACENTESIS W/IMAGING: CPT

## 2018-11-02 PROCEDURE — 99233 SBSQ HOSP IP/OBS HIGH 50: CPT

## 2018-11-02 PROCEDURE — 76937 US GUIDE VASCULAR ACCESS: CPT | Mod: 26

## 2018-11-02 RX ORDER — CHLORHEXIDINE GLUCONATE 213 G/1000ML
1 SOLUTION TOPICAL
Qty: 0 | Refills: 0 | Status: DISCONTINUED | OUTPATIENT
Start: 2018-11-02 | End: 2018-11-08

## 2018-11-02 RX ORDER — ENOXAPARIN SODIUM 100 MG/ML
55 INJECTION SUBCUTANEOUS
Qty: 30 | Refills: 0 | OUTPATIENT
Start: 2018-11-02 | End: 2018-12-01

## 2018-11-02 RX ORDER — MORPHINE SULFATE 50 MG/1
2 CAPSULE, EXTENDED RELEASE ORAL EVERY 4 HOURS
Qty: 0 | Refills: 0 | Status: DISCONTINUED | OUTPATIENT
Start: 2018-11-02 | End: 2018-11-08

## 2018-11-02 RX ADMIN — OXYCODONE HYDROCHLORIDE 5 MILLIGRAM(S): 5 TABLET ORAL at 09:25

## 2018-11-02 RX ADMIN — OXYCODONE HYDROCHLORIDE 5 MILLIGRAM(S): 5 TABLET ORAL at 10:25

## 2018-11-02 RX ADMIN — SENNA PLUS 2 TABLET(S): 8.6 TABLET ORAL at 22:25

## 2018-11-02 NOTE — PROGRESS NOTE ADULT - PROBLEM SELECTOR PLAN 1
- pt unsure if he wants to pursue chemotherapy vs herbal treatment (leaning towards the latter)  - therapuetic paracentesis performed as ascites is most likely malignant - done   Large vol paracenthesis to be done by Ir today  Port to be placed by [facility] emanuel

## 2018-11-02 NOTE — PROGRESS NOTE ADULT - ATTENDING COMMENTS
Ir will do port for chemo and large vol para today Ir will do port for chemo and large vol para today.  Lovenox on hold.  Still with Abd pain.  will give iv morphine prn while NPO

## 2018-11-02 NOTE — PROGRESS NOTE ADULT - SUBJECTIVE AND OBJECTIVE BOX
Patient is a 58y old  Male who presents with a chief complaint of Sent in by oncologist (02 Nov 2018 09:10)      SUBJECTIVE / OVERNIGHT EVENTS:  patient notes he has his usual 7/10 abd pain  Awaiting IR proceedures    MEDICATIONS  (STANDING):  docusate sodium 100 milliGRAM(s) Oral two times a day  lidocaine 1% Injectable 10 milliLiter(s) Local Injection once  multivitamin 1 Tablet(s) Oral daily  senna 2 Tablet(s) Oral at bedtime    MEDICATIONS  (PRN):  acetaminophen   Tablet .. 650 milliGRAM(s) Oral every 6 hours PRN Temp greater or equal to 38C (100.4F), Mild Pain (1 - 3)  oxyCODONE    IR 5 milliGRAM(s) Oral every 4 hours PRN moderate and severe pain  witch hazel Pads 1 Application(s) Topical three times a day PRN hemmorhoidal pain          PHYSICAL EXAM:  Vital Signs Last 24 Hrs  T(C): 36.9 (02 Nov 2018 05:51), Max: 37.1 (01 Nov 2018 22:15)  T(F): 98.4 (02 Nov 2018 05:51), Max: 98.7 (01 Nov 2018 22:15)  HR: 110 (02 Nov 2018 05:51) (104 - 114)  BP: 129/93 (02 Nov 2018 05:51) (129/93 - 142/100)  BP(mean): --  RR: 18 (02 Nov 2018 05:51) (17 - 18)  SpO2: 100% (02 Nov 2018 05:51) (98% - 100%)  GENERAL: THIN MAN  HEAD:  Atraumatic, Normocephalic  EYES: EOMI, PERRLA, conjunctiva and sclera clear  NECK: Supple, No JVD  CHEST/LUNG: Clear to auscultation bilaterally; No wheeze  HEART: Regular rate and rhythm; No murmurs, rubs, or gallops  ABDOMEN: Soft, Distended  Diffuse abd pain, No R/G  EXTREMITIES:  2+ Peripheral Pulses, No clubbing, cyanosis, or edema  PSYCH: AAOx3  NEUROLOGY: non-focal  SKIN: No rashes or lesions    LABS:                        13.6   5.68  )-----------( 374      ( 02 Nov 2018 06:00 )             41.6     11-02    138  |  96<L>  |  15  ----------------------------<  100<H>  4.0   |  26  |  0.50    Ca    8.8      02 Nov 2018 06:00      PT/INR - ( 02 Nov 2018 06:00 )   PT: 12.0 SEC;   INR: 1.05          PTT - ( 02 Nov 2018 06:00 )  PTT:28.5 SEC          RADIOLOGY & ADDITIONAL TESTS:    Imaging Personally Reviewed:    Consultant(s) Notes Reviewed:      Care Discussed with Consultants/Other Providers:

## 2018-11-03 LAB
BASOPHILS # BLD AUTO: 0.02 K/UL — SIGNIFICANT CHANGE UP (ref 0–0.2)
BASOPHILS NFR BLD AUTO: 0.3 % — SIGNIFICANT CHANGE UP (ref 0–2)
BUN SERPL-MCNC: 16 MG/DL — SIGNIFICANT CHANGE UP (ref 7–23)
CALCIUM SERPL-MCNC: 8.8 MG/DL — SIGNIFICANT CHANGE UP (ref 8.4–10.5)
CHLORIDE SERPL-SCNC: 95 MMOL/L — LOW (ref 98–107)
CO2 SERPL-SCNC: 24 MMOL/L — SIGNIFICANT CHANGE UP (ref 22–31)
CREAT SERPL-MCNC: 0.53 MG/DL — SIGNIFICANT CHANGE UP (ref 0.5–1.3)
EOSINOPHIL # BLD AUTO: 0.01 K/UL — SIGNIFICANT CHANGE UP (ref 0–0.5)
EOSINOPHIL NFR BLD AUTO: 0.2 % — SIGNIFICANT CHANGE UP (ref 0–6)
GLUCOSE SERPL-MCNC: 99 MG/DL — SIGNIFICANT CHANGE UP (ref 70–99)
HCT VFR BLD CALC: 42.5 % — SIGNIFICANT CHANGE UP (ref 39–50)
HCT VFR BLD CALC: 42.5 % — SIGNIFICANT CHANGE UP (ref 39–50)
HGB BLD-MCNC: 13.9 G/DL — SIGNIFICANT CHANGE UP (ref 13–17)
HGB BLD-MCNC: 13.9 G/DL — SIGNIFICANT CHANGE UP (ref 13–17)
IMM GRANULOCYTES # BLD AUTO: 0.03 # — SIGNIFICANT CHANGE UP
IMM GRANULOCYTES NFR BLD AUTO: 0.5 % — SIGNIFICANT CHANGE UP (ref 0–1.5)
LYMPHOCYTES # BLD AUTO: 0.95 K/UL — LOW (ref 1–3.3)
LYMPHOCYTES # BLD AUTO: 16.3 % — SIGNIFICANT CHANGE UP (ref 13–44)
MCHC RBC-ENTMCNC: 29.9 PG — SIGNIFICANT CHANGE UP (ref 27–34)
MCHC RBC-ENTMCNC: 29.9 PG — SIGNIFICANT CHANGE UP (ref 27–34)
MCHC RBC-ENTMCNC: 32.7 % — SIGNIFICANT CHANGE UP (ref 32–36)
MCHC RBC-ENTMCNC: 32.7 % — SIGNIFICANT CHANGE UP (ref 32–36)
MCV RBC AUTO: 91.4 FL — SIGNIFICANT CHANGE UP (ref 80–100)
MCV RBC AUTO: 91.4 FL — SIGNIFICANT CHANGE UP (ref 80–100)
MONOCYTES # BLD AUTO: 0.53 K/UL — SIGNIFICANT CHANGE UP (ref 0–0.9)
MONOCYTES NFR BLD AUTO: 9.1 % — SIGNIFICANT CHANGE UP (ref 2–14)
NEUTROPHILS # BLD AUTO: 4.3 K/UL — SIGNIFICANT CHANGE UP (ref 1.8–7.4)
NEUTROPHILS NFR BLD AUTO: 73.6 % — SIGNIFICANT CHANGE UP (ref 43–77)
NON-GYNECOLOGICAL CYTOLOGY STUDY: SIGNIFICANT CHANGE UP
NRBC # FLD: 0 — SIGNIFICANT CHANGE UP
NRBC # FLD: 0 — SIGNIFICANT CHANGE UP
PLATELET # BLD AUTO: 390 K/UL — SIGNIFICANT CHANGE UP (ref 150–400)
PLATELET # BLD AUTO: 390 K/UL — SIGNIFICANT CHANGE UP (ref 150–400)
PMV BLD: 8.7 FL — SIGNIFICANT CHANGE UP (ref 7–13)
PMV BLD: 8.7 FL — SIGNIFICANT CHANGE UP (ref 7–13)
POTASSIUM SERPL-MCNC: 4.2 MMOL/L — SIGNIFICANT CHANGE UP (ref 3.5–5.3)
POTASSIUM SERPL-SCNC: 4.2 MMOL/L — SIGNIFICANT CHANGE UP (ref 3.5–5.3)
RBC # BLD: 4.65 M/UL — SIGNIFICANT CHANGE UP (ref 4.2–5.8)
RBC # BLD: 4.65 M/UL — SIGNIFICANT CHANGE UP (ref 4.2–5.8)
RBC # FLD: 14.1 % — SIGNIFICANT CHANGE UP (ref 10.3–14.5)
RBC # FLD: 14.1 % — SIGNIFICANT CHANGE UP (ref 10.3–14.5)
SODIUM SERPL-SCNC: 138 MMOL/L — SIGNIFICANT CHANGE UP (ref 135–145)
WBC # BLD: 5.84 K/UL — SIGNIFICANT CHANGE UP (ref 3.8–10.5)
WBC # BLD: 5.84 K/UL — SIGNIFICANT CHANGE UP (ref 3.8–10.5)
WBC # FLD AUTO: 5.84 K/UL — SIGNIFICANT CHANGE UP (ref 3.8–10.5)
WBC # FLD AUTO: 5.84 K/UL — SIGNIFICANT CHANGE UP (ref 3.8–10.5)

## 2018-11-03 PROCEDURE — 99233 SBSQ HOSP IP/OBS HIGH 50: CPT

## 2018-11-03 RX ORDER — ENOXAPARIN SODIUM 100 MG/ML
55 INJECTION SUBCUTANEOUS
Qty: 0 | Refills: 0 | Status: DISCONTINUED | OUTPATIENT
Start: 2018-11-03 | End: 2018-11-05

## 2018-11-03 RX ADMIN — CHLORHEXIDINE GLUCONATE 1 APPLICATION(S): 213 SOLUTION TOPICAL at 05:53

## 2018-11-03 RX ADMIN — MORPHINE SULFATE 2 MILLIGRAM(S): 50 CAPSULE, EXTENDED RELEASE ORAL at 06:01

## 2018-11-03 RX ADMIN — MORPHINE SULFATE 2 MILLIGRAM(S): 50 CAPSULE, EXTENDED RELEASE ORAL at 06:20

## 2018-11-03 RX ADMIN — Medication 1 TABLET(S): at 12:07

## 2018-11-03 RX ADMIN — Medication 100 MILLIGRAM(S): at 06:02

## 2018-11-03 RX ADMIN — ENOXAPARIN SODIUM 55 MILLIGRAM(S): 100 INJECTION SUBCUTANEOUS at 18:29

## 2018-11-03 NOTE — PROGRESS NOTE ADULT - ASSESSMENT
59 y/o male with metastatic GE jxn adenocarcinoma (newly dx) admitted with abdominal pain s/p mets to liver , lung and have  peritoneal carcinomatosis.  Dyspnea sec to new PE-Patient had cta of chest- PE

## 2018-11-03 NOTE — PROGRESS NOTE ADULT - PROBLEM SELECTOR PLAN 1
- pt unsure if he wants to pursue chemotherapy vs herbal treatment (leaning towards the latter)  - therapuetic paracentesis  s/p port

## 2018-11-03 NOTE — PROGRESS NOTE ADULT - SUBJECTIVE AND OBJECTIVE BOX
Patient is a 58y old  Male who presents with a chief complaint of Sent in by oncologist (02 Nov 2018 09:10)      patient seen and examine at bed side   still have pain       MEDICATIONS  (STANDING):  chlorhexidine 4% Liquid 1 Application(s) Topical <User Schedule>  docusate sodium 100 milliGRAM(s) Oral two times a day  enoxaparin Injectable 55 milliGRAM(s) SubCutaneous two times a day  lidocaine 1% Injectable 10 milliLiter(s) Local Injection once  multivitamin 1 Tablet(s) Oral daily  senna 2 Tablet(s) Oral at bedtime    MEDICATIONS  (PRN):  acetaminophen   Tablet .. 650 milliGRAM(s) Oral every 6 hours PRN Temp greater or equal to 38C (100.4F), Mild Pain (1 - 3)  morphine  - Injectable 2 milliGRAM(s) IV Push every 4 hours PRN Moderate Pain (4 - 6) and severe pain  oxyCODONE    IR 5 milliGRAM(s) Oral every 4 hours PRN moderate and severe pain  witch hazel Pads 1 Application(s) Topical three times a day PRN hemmorhoidal pain        Vital Signs Last 24 Hrs  T(C): 36.8 (03 Nov 2018 06:19), Max: 36.8 (02 Nov 2018 14:36)  T(F): 98.3 (03 Nov 2018 06:19), Max: 98.3 (03 Nov 2018 06:19)  HR: 114 (03 Nov 2018 06:19) (110 - 116)  BP: 137/91 (03 Nov 2018 06:19) (136/91 - 137/91)  BP(mean): --  RR: 18 (03 Nov 2018 06:19) (18 - 18)  SpO2: 98% (03 Nov 2018 06:19) (98% - 99%)      GENERAL: THIN MAN  HEAD:  Atraumatic, Normocephalic  EYES: EOMI, PERRLA, conjunctiva and sclera clear  NECK: Supple, No JVD  CHEST/LUNG: Clear to auscultation bilaterally; No wheeze  HEART: Regular rate and rhythm; No murmurs, rubs, or gallops  ABDOMEN: Soft, Distended  Diffuse abd pain, No R/G  EXTREMITIES:  2+ Peripheral Pulses, No clubbing, cyanosis, or edema  PSYCH: AAOx3  NEUROLOGY: non-focal  SKIN: No rashes or lesions    LABS:                                                   13.9   5.84  )-----------( 390      ( 03 Nov 2018 06:00 )             42.5       11-03    138  |  95<L>  |  16  ----------------------------<  99  4.2   |  24  |  0.53    Ca    8.8      03 Nov 2018 06:00              RADIOLOGY & ADDITIONAL TESTS:        < from: CT Angio Chest w/ IV Cont (10.29.18 @ 14:12) >  IMPRESSION: Multiple bilateral pulmonary arterial emboli as above, some   of which have an eccentric appearance suggesting chronicity.     Small b/l pleural effusions    1.2 cm right lower lobe nodular opacity as on October 17, 2018 PET/CT may   represent a focus of atelectasis however, three-monthfollow-up chest CT   is recommended for further evaluation.      Imaging Personally Reviewed:    Consultant(s) Notes Reviewed:      Care Discussed with Consultants/Other Providers:

## 2018-11-04 PROCEDURE — 99233 SBSQ HOSP IP/OBS HIGH 50: CPT

## 2018-11-04 RX ADMIN — ENOXAPARIN SODIUM 55 MILLIGRAM(S): 100 INJECTION SUBCUTANEOUS at 18:25

## 2018-11-04 RX ADMIN — OXYCODONE HYDROCHLORIDE 5 MILLIGRAM(S): 5 TABLET ORAL at 11:57

## 2018-11-04 RX ADMIN — ENOXAPARIN SODIUM 55 MILLIGRAM(S): 100 INJECTION SUBCUTANEOUS at 06:37

## 2018-11-04 RX ADMIN — MORPHINE SULFATE 2 MILLIGRAM(S): 50 CAPSULE, EXTENDED RELEASE ORAL at 23:22

## 2018-11-04 RX ADMIN — MORPHINE SULFATE 2 MILLIGRAM(S): 50 CAPSULE, EXTENDED RELEASE ORAL at 23:37

## 2018-11-04 RX ADMIN — CHLORHEXIDINE GLUCONATE 1 APPLICATION(S): 213 SOLUTION TOPICAL at 06:37

## 2018-11-04 RX ADMIN — Medication 1 TABLET(S): at 11:58

## 2018-11-04 RX ADMIN — OXYCODONE HYDROCHLORIDE 5 MILLIGRAM(S): 5 TABLET ORAL at 12:50

## 2018-11-04 NOTE — PROGRESS NOTE ADULT - SUBJECTIVE AND OBJECTIVE BOX
Patient is a 58y old  Male who presents with a chief complaint of Sent in by oncologist (02 Nov 2018 09:10)      patient seen and examine at bed side   still have pain     MEDICATIONS  (STANDING):  chlorhexidine 4% Liquid 1 Application(s) Topical <User Schedule>  docusate sodium 100 milliGRAM(s) Oral two times a day  enoxaparin Injectable 55 milliGRAM(s) SubCutaneous two times a day  lidocaine 1% Injectable 10 milliLiter(s) Local Injection once  multivitamin 1 Tablet(s) Oral daily  senna 2 Tablet(s) Oral at bedtime    MEDICATIONS  (PRN):  acetaminophen   Tablet .. 650 milliGRAM(s) Oral every 6 hours PRN Temp greater or equal to 38C (100.4F), Mild Pain (1 - 3)  morphine  - Injectable 2 milliGRAM(s) IV Push every 4 hours PRN Moderate Pain (4 - 6) and severe pain  oxyCODONE    IR 5 milliGRAM(s) Oral every 4 hours PRN moderate and severe pain  witch hazel Pads 1 Application(s) Topical three times a day PRN hemmorhoidal pain      Vital Signs Last 24 Hrs  T(C): 36.7 (04 Nov 2018 05:18), Max: 36.8 (03 Nov 2018 13:45)  T(F): 98 (04 Nov 2018 05:18), Max: 98.3 (03 Nov 2018 22:00)  HR: 110 (04 Nov 2018 05:18) (110 - 117)  BP: 136/93 (04 Nov 2018 05:18) (136/93 - 147/97)  BP(mean): --  RR: 18 (04 Nov 2018 05:18) (18 - 18)  SpO2: 99% (04 Nov 2018 05:18) (98% - 100%)    GENERAL: THIN MAN  HEAD:  Atraumatic, Normocephalic  EYES: EOMI, PERRLA, conjunctiva and sclera clear  NECK: Supple, No JVD  CHEST/LUNG: Clear to auscultation bilaterally; No wheeze  HEART: Regular rate and rhythm; No murmurs, rubs, or gallops  ABDOMEN: Soft, Distended  Diffuse abd pain, No R/G  EXTREMITIES:  2+ Peripheral Pulses, No clubbing, cyanosis, or edema  PSYCH: AAOx3  NEUROLOGY: non-focal  SKIN: No rashes or lesions    LABS:                                                          13.9   5.84  )-----------( 390      ( 03 Nov 2018 06:00 )             42.5   ''11-03    138  |  95<L>  |  16  ----------------------------<  99  4.2   |  24  |  0.53    Ca    8.8      03 Nov 2018 06:00      no labs today                 RADIOLOGY & ADDITIONAL TESTS:        < from: CT Angio Chest w/ IV Cont (10.29.18 @ 14:12) >  IMPRESSION: Multiple bilateral pulmonary arterial emboli as above, some   of which have an eccentric appearance suggesting chronicity.     Small b/l pleural effusions    1.2 cm right lower lobe nodular opacity as on October 17, 2018 PET/CT may   represent a focus of atelectasis however, three-monthfollow-up chest CT   is recommended for further evaluation.      Imaging Personally Reviewed:    Consultant(s) Notes Reviewed:      Care Discussed with Consultants/Other Providers:

## 2018-11-05 LAB
APTT BLD: 31 SEC — SIGNIFICANT CHANGE UP (ref 27.5–36.3)
BACTERIA FLD CULT: SIGNIFICANT CHANGE UP
BUN SERPL-MCNC: 17 MG/DL — SIGNIFICANT CHANGE UP (ref 7–23)
CALCIUM SERPL-MCNC: 8.7 MG/DL — SIGNIFICANT CHANGE UP (ref 8.4–10.5)
CHLORIDE SERPL-SCNC: 97 MMOL/L — LOW (ref 98–107)
CO2 SERPL-SCNC: 27 MMOL/L — SIGNIFICANT CHANGE UP (ref 22–31)
CREAT SERPL-MCNC: 0.5 MG/DL — SIGNIFICANT CHANGE UP (ref 0.5–1.3)
GLUCOSE SERPL-MCNC: 104 MG/DL — HIGH (ref 70–99)
HCT VFR BLD CALC: 40.7 % — SIGNIFICANT CHANGE UP (ref 39–50)
HGB BLD-MCNC: 13.3 G/DL — SIGNIFICANT CHANGE UP (ref 13–17)
INR BLD: 1.04 — SIGNIFICANT CHANGE UP (ref 0.88–1.17)
MCHC RBC-ENTMCNC: 30.3 PG — SIGNIFICANT CHANGE UP (ref 27–34)
MCHC RBC-ENTMCNC: 32.7 % — SIGNIFICANT CHANGE UP (ref 32–36)
MCV RBC AUTO: 92.7 FL — SIGNIFICANT CHANGE UP (ref 80–100)
NRBC # FLD: 0 — SIGNIFICANT CHANGE UP
PLATELET # BLD AUTO: 393 K/UL — SIGNIFICANT CHANGE UP (ref 150–400)
PMV BLD: 8.6 FL — SIGNIFICANT CHANGE UP (ref 7–13)
POTASSIUM SERPL-MCNC: 4.2 MMOL/L — SIGNIFICANT CHANGE UP (ref 3.5–5.3)
POTASSIUM SERPL-SCNC: 4.2 MMOL/L — SIGNIFICANT CHANGE UP (ref 3.5–5.3)
PROTHROM AB SERPL-ACNC: 11.9 SEC — SIGNIFICANT CHANGE UP (ref 9.8–13.1)
RBC # BLD: 4.39 M/UL — SIGNIFICANT CHANGE UP (ref 4.2–5.8)
RBC # FLD: 13.9 % — SIGNIFICANT CHANGE UP (ref 10.3–14.5)
SODIUM SERPL-SCNC: 140 MMOL/L — SIGNIFICANT CHANGE UP (ref 135–145)
WBC # BLD: 5.61 K/UL — SIGNIFICANT CHANGE UP (ref 3.8–10.5)
WBC # FLD AUTO: 5.61 K/UL — SIGNIFICANT CHANGE UP (ref 3.8–10.5)

## 2018-11-05 PROCEDURE — 99233 SBSQ HOSP IP/OBS HIGH 50: CPT

## 2018-11-05 PROCEDURE — 99233 SBSQ HOSP IP/OBS HIGH 50: CPT | Mod: GC

## 2018-11-05 RX ORDER — LEUCOVORIN CALCIUM 5 MG
322 TABLET ORAL ONCE
Qty: 0 | Refills: 0 | Status: COMPLETED | OUTPATIENT
Start: 2018-11-05 | End: 2018-11-05

## 2018-11-05 RX ORDER — ENOXAPARIN SODIUM 100 MG/ML
80 INJECTION SUBCUTANEOUS DAILY
Qty: 0 | Refills: 0 | Status: DISCONTINUED | OUTPATIENT
Start: 2018-11-06 | End: 2018-11-07

## 2018-11-05 RX ORDER — FLUOROURACIL 50 MG/ML
1610 INJECTION, SOLUTION INTRAVENOUS ONCE
Qty: 0 | Refills: 0 | Status: COMPLETED | OUTPATIENT
Start: 2018-11-05 | End: 2018-11-05

## 2018-11-05 RX ORDER — OXALIPLATIN 5 MG/ML
112 INJECTION, SOLUTION INTRAVENOUS ONCE
Qty: 0 | Refills: 0 | Status: COMPLETED | OUTPATIENT
Start: 2018-11-05 | End: 2018-11-05

## 2018-11-05 RX ORDER — DEXAMETHASONE 0.5 MG/5ML
8 ELIXIR ORAL ONCE
Qty: 0 | Refills: 0 | Status: COMPLETED | OUTPATIENT
Start: 2018-11-05 | End: 2018-11-05

## 2018-11-05 RX ORDER — PALONOSETRON HYDROCHLORIDE 0.25 MG/5ML
0.25 INJECTION, SOLUTION INTRAVENOUS ONCE
Qty: 0 | Refills: 0 | Status: COMPLETED | OUTPATIENT
Start: 2018-11-05 | End: 2018-11-05

## 2018-11-05 RX ORDER — FLUOROURACIL 50 MG/ML
1610 INJECTION, SOLUTION INTRAVENOUS ONCE
Qty: 0 | Refills: 0 | Status: COMPLETED | OUTPATIENT
Start: 2018-11-06 | End: 2018-11-06

## 2018-11-05 RX ORDER — OXYCODONE HYDROCHLORIDE 5 MG/1
10 TABLET ORAL EVERY 4 HOURS
Qty: 0 | Refills: 0 | Status: DISCONTINUED | OUTPATIENT
Start: 2018-11-05 | End: 2018-11-08

## 2018-11-05 RX ORDER — MORPHINE SULFATE 50 MG/1
15 CAPSULE, EXTENDED RELEASE ORAL EVERY 12 HOURS
Qty: 0 | Refills: 0 | Status: DISCONTINUED | OUTPATIENT
Start: 2018-11-05 | End: 2018-11-08

## 2018-11-05 RX ORDER — DEXAMETHASONE 0.5 MG/5ML
8 ELIXIR ORAL ONCE
Qty: 0 | Refills: 0 | Status: COMPLETED | OUTPATIENT
Start: 2018-11-07 | End: 2018-11-07

## 2018-11-05 RX ORDER — DEXAMETHASONE 0.5 MG/5ML
8 ELIXIR ORAL ONCE
Qty: 0 | Refills: 0 | Status: COMPLETED | OUTPATIENT
Start: 2018-11-06 | End: 2018-11-06

## 2018-11-05 RX ORDER — POLYETHYLENE GLYCOL 3350 17 G/17G
17 POWDER, FOR SOLUTION ORAL
Qty: 0 | Refills: 0 | Status: DISCONTINUED | OUTPATIENT
Start: 2018-11-05 | End: 2018-11-08

## 2018-11-05 RX ADMIN — POLYETHYLENE GLYCOL 3350 17 GRAM(S): 17 POWDER, FOR SOLUTION ORAL at 17:24

## 2018-11-05 RX ADMIN — MORPHINE SULFATE 15 MILLIGRAM(S): 50 CAPSULE, EXTENDED RELEASE ORAL at 17:25

## 2018-11-05 RX ADMIN — ENOXAPARIN SODIUM 55 MILLIGRAM(S): 100 INJECTION SUBCUTANEOUS at 17:24

## 2018-11-05 RX ADMIN — MORPHINE SULFATE 2 MILLIGRAM(S): 50 CAPSULE, EXTENDED RELEASE ORAL at 22:00

## 2018-11-05 RX ADMIN — Medication 125 MILLIGRAM(S): at 12:17

## 2018-11-05 RX ADMIN — ENOXAPARIN SODIUM 55 MILLIGRAM(S): 100 INJECTION SUBCUTANEOUS at 05:45

## 2018-11-05 RX ADMIN — MORPHINE SULFATE 2 MILLIGRAM(S): 50 CAPSULE, EXTENDED RELEASE ORAL at 21:31

## 2018-11-05 RX ADMIN — Medication 100 MILLIGRAM(S): at 17:24

## 2018-11-05 RX ADMIN — CHLORHEXIDINE GLUCONATE 1 APPLICATION(S): 213 SOLUTION TOPICAL at 05:45

## 2018-11-05 RX ADMIN — FLUOROURACIL 43.48 MILLIGRAM(S): 50 INJECTION, SOLUTION INTRAVENOUS at 15:43

## 2018-11-05 RX ADMIN — OXALIPLATIN 125 MILLIGRAM(S): 5 INJECTION, SOLUTION INTRAVENOUS at 12:17

## 2018-11-05 RX ADMIN — MORPHINE SULFATE 15 MILLIGRAM(S): 50 CAPSULE, EXTENDED RELEASE ORAL at 17:24

## 2018-11-05 RX ADMIN — Medication 101.6 MILLIGRAM(S): at 11:11

## 2018-11-05 RX ADMIN — PALONOSETRON HYDROCHLORIDE 0.25 MILLIGRAM(S): 0.25 INJECTION, SOLUTION INTRAVENOUS at 11:10

## 2018-11-05 RX ADMIN — Medication 1 TABLET(S): at 11:12

## 2018-11-05 RX ADMIN — OXYCODONE HYDROCHLORIDE 5 MILLIGRAM(S): 5 TABLET ORAL at 01:19

## 2018-11-05 RX ADMIN — OXYCODONE HYDROCHLORIDE 5 MILLIGRAM(S): 5 TABLET ORAL at 02:20

## 2018-11-05 NOTE — PROGRESS NOTE ADULT - SUBJECTIVE AND OBJECTIVE BOX
Patient is a 58y old  Male who presents with a chief complaint of Sent in by oncologist (05 Nov 2018 09:13)      SUBJECTIVE / OVERNIGHT EVENTS:  Patient seen with Oncology Dr Fuentes.  Patient agrees to have chemotherapy in hospital.  Notes he is still here due to lower abd pain - uncontolled    MEDICATIONS  (STANDING):  chlorhexidine 4% Liquid 1 Application(s) Topical <User Schedule>  docusate sodium 100 milliGRAM(s) Oral two times a day  enoxaparin Injectable 55 milliGRAM(s) SubCutaneous two times a day  fluorouracil IVPB (eMAR) 1610 milliGRAM(s) IV Intermittent once  lidocaine 1% Injectable 10 milliLiter(s) Local Injection once  morphine ER Tablet 15 milliGRAM(s) Oral every 12 hours  multivitamin 1 Tablet(s) Oral daily  polyethylene glycol 3350 17 Gram(s) Oral two times a day  senna 2 Tablet(s) Oral at bedtime    MEDICATIONS  (PRN):  acetaminophen   Tablet .. 650 milliGRAM(s) Oral every 6 hours PRN Temp greater or equal to 38C (100.4F), Mild Pain (1 - 3)  morphine  - Injectable 2 milliGRAM(s) IV Push every 4 hours PRN Moderate Pain (4 - 6) and severe pain  oxyCODONE    IR 10 milliGRAM(s) Oral every 4 hours PRN Moderate Pain (4 - 6) and severe pain  witch hazel Pads 1 Application(s) Topical three times a day PRN hemmorhoidal pain        PHYSICAL EXAM:  Vital Signs Last 24 Hrs  T(C): 36.6 (05 Nov 2018 06:37), Max: 36.7 (04 Nov 2018 13:43)  T(F): 97.9 (05 Nov 2018 06:37), Max: 98 (04 Nov 2018 13:43)  HR: 103 (05 Nov 2018 06:37) (103 - 105)  BP: 134/73 (05 Nov 2018 06:37) (134/73 - 147/88)  BP(mean): --  RR: 18 (05 Nov 2018 06:37) (18 - 18)  SpO2: 97% (05 Nov 2018 06:37) (97% - 100%)  GENERAL: Thin Man  HEAD:  Atraumatic, Normocephalic  EYES: EOMI, PERRLA, conjunctiva and sclera clear  NECK: Supple, No JVD  CHEST/LUNG: Clear to auscultation bilaterally; No wheeze  HEART: Regular rate and rhythm; No murmurs, rubs, or gallops  ABDOMEN: Soft, Lower Abd distension  No  pain  EXTREMITIES:  2+ Peripheral Pulses, No clubbing, cyanosis, or edema  PSYCH: AAOx3  NEUROLOGY: non-focal  SKIN: No rashes or lesions    LABS:                        13.3   5.61  )-----------( 393      ( 05 Nov 2018 05:43 )             40.7     11-05    140  |  97<L>  |  17  ----------------------------<  104<H>  4.2   |  27  |  0.50    Ca    8.7      05 Nov 2018 05:43      PT/INR - ( 05 Nov 2018 05:43 )   PT: 11.9 SEC;   INR: 1.04          PTT - ( 05 Nov 2018 05:43 )  PTT:31.0 SEC          RADIOLOGY & ADDITIONAL TESTS:    Imaging Personally Reviewed:    Consultant(s) Notes Reviewed:    Oncology to start Chemo    Care Discussed with Consultants/Other Providers:

## 2018-11-05 NOTE — PROGRESS NOTE ADULT - ASSESSMENT
59 y/o male with metastatic GE jxn adenocarcinoma (newly dx) admitted with abdominal pain s/p mets to liver , lung and have  peritoneal carcinomatosis.  Dyspnea sec to new PE-Patient had cta of chest- PE   GE adenocarcinoma- to get FOLFOX chemo today

## 2018-11-05 NOTE — PROGRESS NOTE ADULT - ATTENDING COMMENTS
Seen with Onc  Still with PAIN- pain control  Will get FOLFOX chemo today until Wednesday Seen with Onc  Still with PAIN- pain control  Will get FOLFOX chemo today until Wednesday  PAIN reg increased

## 2018-11-05 NOTE — PROGRESS NOTE ADULT - SUBJECTIVE AND OBJECTIVE BOX
Patient is starting cycle 1 FOLFOX today.        Vital Signs Last 24 Hrs  T(C): 36.9 (05 Nov 2018 14:32), Max: 36.9 (05 Nov 2018 14:32)  T(F): 98.4 (05 Nov 2018 14:32), Max: 98.4 (05 Nov 2018 14:32)  HR: 103 (05 Nov 2018 14:32) (103 - 104)  BP: 138/96 (05 Nov 2018 14:32) (134/73 - 145/99)  BP(mean): --  RR: 18 (05 Nov 2018 14:32) (18 - 18)  SpO2: 99% (05 Nov 2018 14:32) (97% - 99%)    PHYSICAL EXAM  General: adult in NAD  HEENT: clear oropharynx, anicteric sclera, pink conjunctiva  Neck: supple  CV: normal S1/S2 with no murmur rubs or gallops  Lungs: positive air movement b/l ant lungs,clear to auscultation, no wheezes, no rales  Abdomen: soft non-tender non-distended, no hepatosplenomegaly  Ext: no clubbing cyanosis or edema  Skin: no rashes and no petechiae  Neuro: alert and oriented X 4, no focal deficits    MEDICATIONS  (STANDING):  chlorhexidine 4% Liquid 1 Application(s) Topical <User Schedule>  docusate sodium 100 milliGRAM(s) Oral two times a day  lidocaine 1% Injectable 10 milliLiter(s) Local Injection once  morphine ER Tablet 15 milliGRAM(s) Oral every 12 hours  multivitamin 1 Tablet(s) Oral daily  polyethylene glycol 3350 17 Gram(s) Oral two times a day  senna 2 Tablet(s) Oral at bedtime    MEDICATIONS  (PRN):  acetaminophen   Tablet .. 650 milliGRAM(s) Oral every 6 hours PRN Temp greater or equal to 38C (100.4F), Mild Pain (1 - 3)  morphine  - Injectable 2 milliGRAM(s) IV Push every 4 hours PRN Moderate Pain (4 - 6) and severe pain  oxyCODONE    IR 10 milliGRAM(s) Oral every 4 hours PRN Moderate Pain (4 - 6) and severe pain  witch hazel Pads 1 Application(s) Topical three times a day PRN hemmorhoidal pain      LABS:                          13.3   5.61  )-----------( 393      ( 05 Nov 2018 05:43 )             40.7         Mean Cell Volume : 92.7 fL  Mean Cell Hemoglobin : 30.3 pg  Mean Cell Hemoglobin Concentration : 32.7 %  Auto Neutrophil # : x  Auto Lymphocyte # : x  Auto Monocyte # : x  Auto Eosinophil # : x  Auto Basophil # : x  Auto Neutrophil % : x  Auto Lymphocyte % : x  Auto Monocyte % : x  Auto Eosinophil % : x  Auto Basophil % : x      Serial CBC's  11-05 @ 05:43  Hct-40.7 / Hgb-13.3 / Plat-393 / RBC-4.39 / WBC-5.61  Serial CBC's  11-03 @ 06:00  Hct-42.5 / Hgb-13.9 / Plat-390 / RBC-4.65 / WBC-5.84  Serial CBC's  11-02 @ 06:00  Hct-41.6 / Hgb-13.6 / Plat-374 / RBC-4.58 / WBC-5.68      11-05    140  |  97<L>  |  17  ----------------------------<  104<H>  4.2   |  27  |  0.50    Ca    8.7      05 Nov 2018 05:43        PT/INR - ( 05 Nov 2018 05:43 )   PT: 11.9 SEC;   INR: 1.04          PTT - ( 05 Nov 2018 05:43 )  PTT:31.0 SEC                            BLOOD SMEAR INTERPRETATION:       RADIOLOGY & ADDITIONAL STUDIES:

## 2018-11-05 NOTE — PROGRESS NOTE ADULT - PROBLEM SELECTOR PLAN 1
stage IV GEJ adenocarcinoma with HER2 shantal negative  start cycle 1 D1 FOLFOX for two days. If patient feels fine. Patient can be discharged home

## 2018-11-05 NOTE — PROGRESS NOTE ADULT - ASSESSMENT
stage IV GEJ adenocarcinoma with extensive liver metastasis, peritoneal carcinomatosis and large malignant ascites

## 2018-11-06 LAB
ALBUMIN SERPL ELPH-MCNC: 2.8 G/DL — LOW (ref 3.3–5)
ALP SERPL-CCNC: 110 U/L — SIGNIFICANT CHANGE UP (ref 40–120)
ALT FLD-CCNC: 17 U/L — SIGNIFICANT CHANGE UP (ref 4–41)
AST SERPL-CCNC: 18 U/L — SIGNIFICANT CHANGE UP (ref 4–40)
BASOPHILS # BLD AUTO: 0.01 K/UL — SIGNIFICANT CHANGE UP (ref 0–0.2)
BASOPHILS NFR BLD AUTO: 0.2 % — SIGNIFICANT CHANGE UP (ref 0–2)
BILIRUB SERPL-MCNC: 0.7 MG/DL — SIGNIFICANT CHANGE UP (ref 0.2–1.2)
BUN SERPL-MCNC: 14 MG/DL — SIGNIFICANT CHANGE UP (ref 7–23)
CALCIUM SERPL-MCNC: 8.7 MG/DL — SIGNIFICANT CHANGE UP (ref 8.4–10.5)
CHLORIDE SERPL-SCNC: 95 MMOL/L — LOW (ref 98–107)
CO2 SERPL-SCNC: 29 MMOL/L — SIGNIFICANT CHANGE UP (ref 22–31)
CREAT SERPL-MCNC: 0.5 MG/DL — SIGNIFICANT CHANGE UP (ref 0.5–1.3)
EOSINOPHIL # BLD AUTO: 0.01 K/UL — SIGNIFICANT CHANGE UP (ref 0–0.5)
EOSINOPHIL NFR BLD AUTO: 0.2 % — SIGNIFICANT CHANGE UP (ref 0–6)
GLUCOSE SERPL-MCNC: 112 MG/DL — HIGH (ref 70–99)
HCT VFR BLD CALC: 39.4 % — SIGNIFICANT CHANGE UP (ref 39–50)
HGB BLD-MCNC: 13.1 G/DL — SIGNIFICANT CHANGE UP (ref 13–17)
IMM GRANULOCYTES # BLD AUTO: 0.01 # — SIGNIFICANT CHANGE UP
IMM GRANULOCYTES NFR BLD AUTO: 0.2 % — SIGNIFICANT CHANGE UP (ref 0–1.5)
LYMPHOCYTES # BLD AUTO: 1.14 K/UL — SIGNIFICANT CHANGE UP (ref 1–3.3)
LYMPHOCYTES # BLD AUTO: 22.1 % — SIGNIFICANT CHANGE UP (ref 13–44)
MAGNESIUM SERPL-MCNC: 2.2 MG/DL — SIGNIFICANT CHANGE UP (ref 1.6–2.6)
MCHC RBC-ENTMCNC: 30.2 PG — SIGNIFICANT CHANGE UP (ref 27–34)
MCHC RBC-ENTMCNC: 33.2 % — SIGNIFICANT CHANGE UP (ref 32–36)
MCV RBC AUTO: 90.8 FL — SIGNIFICANT CHANGE UP (ref 80–100)
MONOCYTES # BLD AUTO: 0.54 K/UL — SIGNIFICANT CHANGE UP (ref 0–0.9)
MONOCYTES NFR BLD AUTO: 10.5 % — SIGNIFICANT CHANGE UP (ref 2–14)
NEUTROPHILS # BLD AUTO: 3.45 K/UL — SIGNIFICANT CHANGE UP (ref 1.8–7.4)
NEUTROPHILS NFR BLD AUTO: 66.8 % — SIGNIFICANT CHANGE UP (ref 43–77)
NRBC # FLD: 0 — SIGNIFICANT CHANGE UP
PHOSPHATE SERPL-MCNC: 3.8 MG/DL — SIGNIFICANT CHANGE UP (ref 2.5–4.5)
PLATELET # BLD AUTO: 398 K/UL — SIGNIFICANT CHANGE UP (ref 150–400)
PMV BLD: 8.5 FL — SIGNIFICANT CHANGE UP (ref 7–13)
POTASSIUM SERPL-MCNC: 4.2 MMOL/L — SIGNIFICANT CHANGE UP (ref 3.5–5.3)
POTASSIUM SERPL-SCNC: 4.2 MMOL/L — SIGNIFICANT CHANGE UP (ref 3.5–5.3)
PROT SERPL-MCNC: 6.3 G/DL — SIGNIFICANT CHANGE UP (ref 6–8.3)
RBC # BLD: 4.34 M/UL — SIGNIFICANT CHANGE UP (ref 4.2–5.8)
RBC # FLD: 14 % — SIGNIFICANT CHANGE UP (ref 10.3–14.5)
SODIUM SERPL-SCNC: 136 MMOL/L — SIGNIFICANT CHANGE UP (ref 135–145)
WBC # BLD: 5.16 K/UL — SIGNIFICANT CHANGE UP (ref 3.8–10.5)
WBC # FLD AUTO: 5.16 K/UL — SIGNIFICANT CHANGE UP (ref 3.8–10.5)

## 2018-11-06 PROCEDURE — 99233 SBSQ HOSP IP/OBS HIGH 50: CPT | Mod: GC

## 2018-11-06 PROCEDURE — 99233 SBSQ HOSP IP/OBS HIGH 50: CPT

## 2018-11-06 RX ADMIN — Medication 101.6 MILLIGRAM(S): at 16:54

## 2018-11-06 RX ADMIN — MORPHINE SULFATE 15 MILLIGRAM(S): 50 CAPSULE, EXTENDED RELEASE ORAL at 17:10

## 2018-11-06 RX ADMIN — ENOXAPARIN SODIUM 80 MILLIGRAM(S): 100 INJECTION SUBCUTANEOUS at 11:52

## 2018-11-06 RX ADMIN — FLUOROURACIL 43.48 MILLIGRAM(S): 50 INJECTION, SOLUTION INTRAVENOUS at 17:24

## 2018-11-06 RX ADMIN — MORPHINE SULFATE 15 MILLIGRAM(S): 50 CAPSULE, EXTENDED RELEASE ORAL at 07:30

## 2018-11-06 RX ADMIN — Medication 1 TABLET(S): at 11:49

## 2018-11-06 RX ADMIN — CHLORHEXIDINE GLUCONATE 1 APPLICATION(S): 213 SOLUTION TOPICAL at 06:46

## 2018-11-06 RX ADMIN — MORPHINE SULFATE 15 MILLIGRAM(S): 50 CAPSULE, EXTENDED RELEASE ORAL at 06:47

## 2018-11-06 NOTE — PROGRESS NOTE ADULT - ASSESSMENT
57 y/o male with metastatic GE jxn adenocarcinoma (newly dx) admitted with abdominal pain s/p mets to liver , lung and have  peritoneal carcinomatosis.  Dyspnea sec to new PE-Patient had cta of chest- PE   GE adenocarcinoma- to get FOLFOX chemo today  FFt  Severe protein erlinda malnutrition.  Patient wants to c/w chemothrapy

## 2018-11-06 NOTE — PROGRESS NOTE ADULT - PROBLEM SELECTOR PLAN 1
stage IV GEJ adenocarcinoma with HER2 shantal negative  start cycle 1 D2 FOLFOX for two days. If patient feels fine. Patient can be discharged home on 11/7

## 2018-11-06 NOTE — CHART NOTE - NSCHARTNOTEFT_GEN_A_CORE
57 y/o M with likely primary GEJ malignancy with mets to liver and peritoneum, PE, DVT. Pt requires hospital bed for positioning, head of bed elevation that cannot be achieved with a regular bed. Pt is prone to skin breakdown due to his poor nutritional intake.

## 2018-11-06 NOTE — PROGRESS NOTE ADULT - SUBJECTIVE AND OBJECTIVE BOX
Patient is a 58y old  Male who presents with a chief complaint of Sent in by oncologist (06 Nov 2018 12:58)      SUBJECTIVE / OVERNIGHT EVENTS:  Patient is comfortable and getting chemotherapy- planning for home 11/7 after chemo ends- patient aware    MEDICATIONS  (STANDING):  chlorhexidine 4% Liquid 1 Application(s) Topical <User Schedule>  docusate sodium 100 milliGRAM(s) Oral two times a day  enoxaparin Injectable 80 milliGRAM(s) SubCutaneous daily  lidocaine 1% Injectable 10 milliLiter(s) Local Injection once  morphine ER Tablet 15 milliGRAM(s) Oral every 12 hours  multivitamin 1 Tablet(s) Oral daily  polyethylene glycol 3350 17 Gram(s) Oral two times a day  senna 2 Tablet(s) Oral at bedtime    MEDICATIONS  (PRN):  acetaminophen   Tablet .. 650 milliGRAM(s) Oral every 6 hours PRN Temp greater or equal to 38C (100.4F), Mild Pain (1 - 3)  morphine  - Injectable 2 milliGRAM(s) IV Push every 4 hours PRN Moderate Pain (4 - 6) and severe pain  oxyCODONE    IR 10 milliGRAM(s) Oral every 4 hours PRN Moderate Pain (4 - 6) and severe pain  witch hazel Pads 1 Application(s) Topical three times a day PRN hemmorhoidal pain        PHYSICAL EXAM:  Vital Signs Last 24 Hrs  T(C): 36.9 (06 Nov 2018 12:05), Max: 36.9 (05 Nov 2018 20:31)  T(F): 98.5 (06 Nov 2018 12:05), Max: 98.5 (06 Nov 2018 06:22)  HR: 105 (06 Nov 2018 12:05) (105 - 107)  BP: 142/92 (06 Nov 2018 12:05) (116/68 - 142/92)  BP(mean): --  RR: 16 (06 Nov 2018 12:05) (16 - 18)  SpO2: 99% (06 Nov 2018 12:05) (97% - 99%)  GENERAL: NAD, well-developed  HEAD:  Atraumatic, Normocephalic  EYES: EOMI, PERRLA, conjunctiva and sclera clear  NECK: Supple, No JVD  CHEST/LUNG: Clear to auscultation bilaterally; No wheeze  HEART: Regular rate and rhythm; No murmurs, rubs, or gallops  ABDOMEN: Soft, distended, non tender  EXTREMITIES:  2+ Peripheral Pulses, No clubbing, cyanosis, or edema  PSYCH: AAOx3  NEUROLOGY: non-focal  SKIN: No rashes or lesions    LABS:                        13.1   5.16  )-----------( 398      ( 06 Nov 2018 06:30 )             39.4     11-06    136  |  95<L>  |  14  ----------------------------<  112<H>  4.2   |  29  |  0.50    Ca    8.7      06 Nov 2018 06:30  Phos  3.8     11-06  Mg     2.2     11-06    TPro  6.3  /  Alb  2.8<L>  /  TBili  0.7  /  DBili  x   /  AST  18  /  ALT  17  /  AlkPhos  110  11-06    PT/INR - ( 05 Nov 2018 05:43 )   PT: 11.9 SEC;   INR: 1.04          PTT - ( 05 Nov 2018 05:43 )  PTT:31.0 SEC          RADIOLOGY & ADDITIONAL TESTS:    Imaging Personally Reviewed:    Consultant(s) Notes Reviewed:      Care Discussed with Consultants/Other Providers:  Oncology

## 2018-11-06 NOTE — PROGRESS NOTE ADULT - SUBJECTIVE AND OBJECTIVE BOX
Patient is starting cycle 1 D2 FOLFOX today. Patient feels fine, denies nausea, vomiting, fatigue, diarrhea and constipation.         Vital Signs Last 24 Hrs  T(C): 36.9 (05 Nov 2018 14:32), Max: 36.9 (05 Nov 2018 14:32)  T(F): 98.4 (05 Nov 2018 14:32), Max: 98.4 (05 Nov 2018 14:32)  HR: 103 (05 Nov 2018 14:32) (103 - 104)  BP: 138/96 (05 Nov 2018 14:32) (134/73 - 145/99)  BP(mean): --  RR: 18 (05 Nov 2018 14:32) (18 - 18)  SpO2: 99% (05 Nov 2018 14:32) (97% - 99%)    PHYSICAL EXAM  General: adult in NAD  HEENT: clear oropharynx, anicteric sclera, pink conjunctiva  Neck: supple  CV: normal S1/S2 with no murmur rubs or gallops  Lungs: positive air movement b/l ant lungs,clear to auscultation, no wheezes, no rales  Abdomen: soft non-tender non-distended, no hepatosplenomegaly  Ext: no clubbing cyanosis or edema  Skin: no rashes and no petechiae  Neuro: alert and oriented X 4, no focal deficits    MEDICATIONS  (STANDING):  chlorhexidine 4% Liquid 1 Application(s) Topical <User Schedule>  docusate sodium 100 milliGRAM(s) Oral two times a day  lidocaine 1% Injectable 10 milliLiter(s) Local Injection once  morphine ER Tablet 15 milliGRAM(s) Oral every 12 hours  multivitamin 1 Tablet(s) Oral daily  polyethylene glycol 3350 17 Gram(s) Oral two times a day  senna 2 Tablet(s) Oral at bedtime    MEDICATIONS  (PRN):  acetaminophen   Tablet .. 650 milliGRAM(s) Oral every 6 hours PRN Temp greater or equal to 38C (100.4F), Mild Pain (1 - 3)  morphine  - Injectable 2 milliGRAM(s) IV Push every 4 hours PRN Moderate Pain (4 - 6) and severe pain  oxyCODONE    IR 10 milliGRAM(s) Oral every 4 hours PRN Moderate Pain (4 - 6) and severe pain  witch hazel Pads 1 Application(s) Topical three times a day PRN hemmorhoidal pain      LABS:                          13.3   5.61  )-----------( 393      ( 05 Nov 2018 05:43 )             40.7         Mean Cell Volume : 92.7 fL  Mean Cell Hemoglobin : 30.3 pg  Mean Cell Hemoglobin Concentration : 32.7 %  Auto Neutrophil # : x  Auto Lymphocyte # : x  Auto Monocyte # : x  Auto Eosinophil # : x  Auto Basophil # : x  Auto Neutrophil % : x  Auto Lymphocyte % : x  Auto Monocyte % : x  Auto Eosinophil % : x  Auto Basophil % : x      Serial CBC's  11-05 @ 05:43  Hct-40.7 / Hgb-13.3 / Plat-393 / RBC-4.39 / WBC-5.61  Serial CBC's  11-03 @ 06:00  Hct-42.5 / Hgb-13.9 / Plat-390 / RBC-4.65 / WBC-5.84  Serial CBC's  11-02 @ 06:00  Hct-41.6 / Hgb-13.6 / Plat-374 / RBC-4.58 / WBC-5.68      11-05    140  |  97<L>  |  17  ----------------------------<  104<H>  4.2   |  27  |  0.50    Ca    8.7      05 Nov 2018 05:43        PT/INR - ( 05 Nov 2018 05:43 )   PT: 11.9 SEC;   INR: 1.04          PTT - ( 05 Nov 2018 05:43 )  PTT:31.0 SEC                            BLOOD SMEAR INTERPRETATION:       RADIOLOGY & ADDITIONAL STUDIES:

## 2018-11-07 LAB
ALBUMIN SERPL ELPH-MCNC: 3.1 G/DL — LOW (ref 3.3–5)
ALP SERPL-CCNC: 124 U/L — HIGH (ref 40–120)
ALT FLD-CCNC: 38 U/L — SIGNIFICANT CHANGE UP (ref 4–41)
AST SERPL-CCNC: 55 U/L — HIGH (ref 4–40)
BASOPHILS # BLD AUTO: 0.01 K/UL — SIGNIFICANT CHANGE UP (ref 0–0.2)
BASOPHILS NFR BLD AUTO: 0.2 % — SIGNIFICANT CHANGE UP (ref 0–2)
BILIRUB SERPL-MCNC: 0.9 MG/DL — SIGNIFICANT CHANGE UP (ref 0.2–1.2)
BUN SERPL-MCNC: 16 MG/DL — SIGNIFICANT CHANGE UP (ref 7–23)
CALCIUM SERPL-MCNC: 9.1 MG/DL — SIGNIFICANT CHANGE UP (ref 8.4–10.5)
CHLORIDE SERPL-SCNC: 94 MMOL/L — LOW (ref 98–107)
CO2 SERPL-SCNC: 30 MMOL/L — SIGNIFICANT CHANGE UP (ref 22–31)
CREAT SERPL-MCNC: 0.51 MG/DL — SIGNIFICANT CHANGE UP (ref 0.5–1.3)
EOSINOPHIL # BLD AUTO: 0 K/UL — SIGNIFICANT CHANGE UP (ref 0–0.5)
EOSINOPHIL NFR BLD AUTO: 0 % — SIGNIFICANT CHANGE UP (ref 0–6)
GLUCOSE SERPL-MCNC: 127 MG/DL — HIGH (ref 70–99)
HCT VFR BLD CALC: 40.8 % — SIGNIFICANT CHANGE UP (ref 39–50)
HGB BLD-MCNC: 13.4 G/DL — SIGNIFICANT CHANGE UP (ref 13–17)
IMM GRANULOCYTES # BLD AUTO: 0.02 # — SIGNIFICANT CHANGE UP
IMM GRANULOCYTES NFR BLD AUTO: 0.4 % — SIGNIFICANT CHANGE UP (ref 0–1.5)
LDH SERPL L TO P-CCNC: 266 U/L — HIGH (ref 135–225)
LYMPHOCYTES # BLD AUTO: 0.93 K/UL — LOW (ref 1–3.3)
LYMPHOCYTES # BLD AUTO: 17.5 % — SIGNIFICANT CHANGE UP (ref 13–44)
MAGNESIUM SERPL-MCNC: 2.3 MG/DL — SIGNIFICANT CHANGE UP (ref 1.6–2.6)
MCHC RBC-ENTMCNC: 29.7 PG — SIGNIFICANT CHANGE UP (ref 27–34)
MCHC RBC-ENTMCNC: 32.8 % — SIGNIFICANT CHANGE UP (ref 32–36)
MCV RBC AUTO: 90.5 FL — SIGNIFICANT CHANGE UP (ref 80–100)
MONOCYTES # BLD AUTO: 0.36 K/UL — SIGNIFICANT CHANGE UP (ref 0–0.9)
MONOCYTES NFR BLD AUTO: 6.8 % — SIGNIFICANT CHANGE UP (ref 2–14)
NEUTROPHILS # BLD AUTO: 4 K/UL — SIGNIFICANT CHANGE UP (ref 1.8–7.4)
NEUTROPHILS NFR BLD AUTO: 75.1 % — SIGNIFICANT CHANGE UP (ref 43–77)
NRBC # FLD: 0 — SIGNIFICANT CHANGE UP
PHOSPHATE SERPL-MCNC: 4.2 MG/DL — SIGNIFICANT CHANGE UP (ref 2.5–4.5)
PLATELET # BLD AUTO: 407 K/UL — HIGH (ref 150–400)
PMV BLD: 8.6 FL — SIGNIFICANT CHANGE UP (ref 7–13)
POTASSIUM SERPL-MCNC: 4.5 MMOL/L — SIGNIFICANT CHANGE UP (ref 3.5–5.3)
POTASSIUM SERPL-SCNC: 4.5 MMOL/L — SIGNIFICANT CHANGE UP (ref 3.5–5.3)
PROT SERPL-MCNC: 6.7 G/DL — SIGNIFICANT CHANGE UP (ref 6–8.3)
RBC # BLD: 4.51 M/UL — SIGNIFICANT CHANGE UP (ref 4.2–5.8)
RBC # FLD: 14 % — SIGNIFICANT CHANGE UP (ref 10.3–14.5)
SODIUM SERPL-SCNC: 135 MMOL/L — SIGNIFICANT CHANGE UP (ref 135–145)
URATE SERPL-MCNC: 3.7 MG/DL — SIGNIFICANT CHANGE UP (ref 3.4–8.8)
WBC # BLD: 5.32 K/UL — SIGNIFICANT CHANGE UP (ref 3.8–10.5)
WBC # FLD AUTO: 5.32 K/UL — SIGNIFICANT CHANGE UP (ref 3.8–10.5)

## 2018-11-07 PROCEDURE — 99233 SBSQ HOSP IP/OBS HIGH 50: CPT

## 2018-11-07 PROCEDURE — 99233 SBSQ HOSP IP/OBS HIGH 50: CPT | Mod: GC

## 2018-11-07 RX ORDER — LIDOCAINE HCL 20 MG/ML
10 VIAL (ML) INJECTION ONCE
Qty: 0 | Refills: 0 | Status: DISCONTINUED | OUTPATIENT
Start: 2018-11-07 | End: 2018-11-08

## 2018-11-07 RX ADMIN — MORPHINE SULFATE 2 MILLIGRAM(S): 50 CAPSULE, EXTENDED RELEASE ORAL at 02:10

## 2018-11-07 RX ADMIN — Medication 101.6 MILLIGRAM(S): at 17:44

## 2018-11-07 RX ADMIN — MORPHINE SULFATE 15 MILLIGRAM(S): 50 CAPSULE, EXTENDED RELEASE ORAL at 18:26

## 2018-11-07 RX ADMIN — MORPHINE SULFATE 15 MILLIGRAM(S): 50 CAPSULE, EXTENDED RELEASE ORAL at 08:18

## 2018-11-07 RX ADMIN — Medication 1 TABLET(S): at 12:40

## 2018-11-07 RX ADMIN — MORPHINE SULFATE 2 MILLIGRAM(S): 50 CAPSULE, EXTENDED RELEASE ORAL at 01:55

## 2018-11-07 RX ADMIN — MORPHINE SULFATE 15 MILLIGRAM(S): 50 CAPSULE, EXTENDED RELEASE ORAL at 07:18

## 2018-11-07 RX ADMIN — CHLORHEXIDINE GLUCONATE 1 APPLICATION(S): 213 SOLUTION TOPICAL at 07:19

## 2018-11-07 NOTE — PROGRESS NOTE ADULT - PROBLEM SELECTOR PLAN 4
Patient wants chemotherapy.  s/p port placed   f/u with hema / onc Patient wants chemotherapy.  s/p port placed   Folfox chemo in progress. ends 11/7

## 2018-11-07 NOTE — PROGRESS NOTE ADULT - ASSESSMENT
57 y/o male with metastatic GE jxn adenocarcinoma (newly dx) admitted with abdominal pain s/p mets to liver , lung and have  peritoneal carcinomatosis.  Dyspnea sec to new PE-Patient had cta of chest- PE   GE adenocarcinoma- to get FOLFOX chemo today  FFt  Severe protein erlinda malnutrition.  Patient wants to c/w chemothrapy 57 y/o male with metastatic GE jxn adenocarcinoma (newly dx) admitted with abdominal pain s/p mets to liver , lung and have  peritoneal carcinomatosis.  Dyspnea sec to new PE-Patient had cta of chest- PE   GE adenocarcinoma- to get FOLFOX chemo today  FFt  Severe protein erlinda malnutrition with Thin body  Patient getting  chemotherapy  Procedure team will do para 11/8 AM after holding Lovenox

## 2018-11-07 NOTE — PROGRESS NOTE ADULT - PROBLEM SELECTOR PLAN 1
stage IV GEJ adenocarcinoma with HER2 shantal negative  start cycle 1 D3 FOLFOX for two days. He has abdominal distension with two days no bowel movement. Please increase bowel regimen. He also has ascites. Please do therapeutic paracentesis before discharge.

## 2018-11-07 NOTE — PROGRESS NOTE ADULT - SUBJECTIVE AND OBJECTIVE BOX
Patient is a 58y old  Male who presents with a chief complaint of Sent in by oncologist (07 Nov 2018 08:56)      SUBJECTIVE / OVERNIGHT EVENTS:  Patient c/o abd distension and feeling weak.  Patient has been ambulating to bathroom and declines Pt eval.  Pt wants paracenthesis again.  Patient notes his pain is controlled    MEDICATIONS  (STANDING):  chlorhexidine 4% Liquid 1 Application(s) Topical <User Schedule>  dexamethasone  IVPB 8 milliGRAM(s) IV Intermittent once  docusate sodium 100 milliGRAM(s) Oral two times a day  enoxaparin Injectable 80 milliGRAM(s) SubCutaneous daily  lidocaine 1% Injectable 10 milliLiter(s) Local Injection once  morphine ER Tablet 15 milliGRAM(s) Oral every 12 hours  multivitamin 1 Tablet(s) Oral daily  polyethylene glycol 3350 17 Gram(s) Oral two times a day  senna 2 Tablet(s) Oral at bedtime    MEDICATIONS  (PRN):  acetaminophen   Tablet .. 650 milliGRAM(s) Oral every 6 hours PRN Temp greater or equal to 38C (100.4F), Mild Pain (1 - 3)  morphine  - Injectable 2 milliGRAM(s) IV Push every 4 hours PRN Moderate Pain (4 - 6) and severe pain  oxyCODONE    IR 10 milliGRAM(s) Oral every 4 hours PRN Moderate Pain (4 - 6) and severe pain  witch hazel Pads 1 Application(s) Topical three times a day PRN hemmorhoidal pain      PHYSICAL EXAM:  Vital Signs Last 24 Hrs  T(C): 36.8 (07 Nov 2018 05:47), Max: 37.1 (06 Nov 2018 21:35)  T(F): 98.2 (07 Nov 2018 05:47), Max: 98.7 (06 Nov 2018 21:35)  HR: 110 (07 Nov 2018 05:47) (105 - 113)  BP: 135/96 (07 Nov 2018 05:47) (135/96 - 142/92)  BP(mean): --  RR: 18 (07 Nov 2018 05:47) (16 - 18)  SpO2: 99% (07 Nov 2018 05:47) (97% - 99%)  GENERAL: Thin Man  HEAD:  Atraumatic, Normocephalic  EYES: EOMI, PERRLA, conjunctiva and sclera clear  NECK: Supple, No JVD  CHEST/LUNG: Mild dec BS to L base  R chest port  HEART: Regular rate and rhythm; No murmurs, rubs, or gallops  ABDOMEN: Soft, Nontender, distended  EXTREMITIES:  2+ Peripheral Pulses, No clubbing, cyanosis, or edema  PSYCH: AAOx3  NEUROLOGY: non-focal  SKIN: No rashes or lesions    LABS:                        13.4   5.32  )-----------( 407      ( 07 Nov 2018 07:20 )             40.8     11-07    135  |  94<L>  |  16  ----------------------------<  127<H>  4.5   |  30  |  0.51    Ca    9.1      07 Nov 2018 07:20  Phos  4.2     11-07  Mg     2.3     11-07    TPro  6.7  /  Alb  3.1<L>  /  TBili  0.9  /  DBili  x   /  AST  55<H>  /  ALT  38  /  AlkPhos  124<H>  11-07      RADIOLOGY & ADDITIONAL TESTS:    Imaging Personally Reviewed:    Consultant(s) Notes Reviewed:      Care Discussed with Consultants/Other Providers:  Oncology

## 2018-11-07 NOTE — PROGRESS NOTE ADULT - PROBLEM SELECTOR PLAN 5
Ms contin 15 mg q12 . Inc Oxy ir to 10 g q4 prn with bowel regimen.  Patient controlled Ms contin 15 mg q12 . Inc Oxy ir to 10 g q4 prn with bowel regimen.  Patient notes pain IS  controlled

## 2018-11-07 NOTE — PROGRESS NOTE ADULT - SUBJECTIVE AND OBJECTIVE BOX
Patient is starting cycle 1 D3 FOLFOX today. Patient feels abdominal distension, otherwise fine, denies nausea, vomiting, fatigue, diarrhea.         Vital Signs Last 24 Hrs  T(C): 36.9 (05 Nov 2018 14:32), Max: 36.9 (05 Nov 2018 14:32)  T(F): 98.4 (05 Nov 2018 14:32), Max: 98.4 (05 Nov 2018 14:32)  HR: 103 (05 Nov 2018 14:32) (103 - 104)  BP: 138/96 (05 Nov 2018 14:32) (134/73 - 145/99)  BP(mean): --  RR: 18 (05 Nov 2018 14:32) (18 - 18)  SpO2: 99% (05 Nov 2018 14:32) (97% - 99%)    PHYSICAL EXAM  General: adult in NAD  HEENT: clear oropharynx, anicteric sclera, pink conjunctiva  Neck: supple  CV: normal S1/S2 with no murmur rubs or gallops  Lungs: positive air movement b/l ant lungs,clear to auscultation, no wheezes, no rales  Abdomen: distended, soft non-tender, no hepatosplenomegaly  Ext: no clubbing cyanosis or edema  Skin: no rashes and no petechiae  Neuro: alert and oriented X 4, no focal deficits    MEDICATIONS  (STANDING):  chlorhexidine 4% Liquid 1 Application(s) Topical <User Schedule>  docusate sodium 100 milliGRAM(s) Oral two times a day  lidocaine 1% Injectable 10 milliLiter(s) Local Injection once  morphine ER Tablet 15 milliGRAM(s) Oral every 12 hours  multivitamin 1 Tablet(s) Oral daily  polyethylene glycol 3350 17 Gram(s) Oral two times a day  senna 2 Tablet(s) Oral at bedtime    MEDICATIONS  (PRN):  acetaminophen   Tablet .. 650 milliGRAM(s) Oral every 6 hours PRN Temp greater or equal to 38C (100.4F), Mild Pain (1 - 3)  morphine  - Injectable 2 milliGRAM(s) IV Push every 4 hours PRN Moderate Pain (4 - 6) and severe pain  oxyCODONE    IR 10 milliGRAM(s) Oral every 4 hours PRN Moderate Pain (4 - 6) and severe pain  witch hazel Pads 1 Application(s) Topical three times a day PRN hemmorhoidal pain      LABS:                          13.3   5.61  )-----------( 393      ( 05 Nov 2018 05:43 )             40.7         Mean Cell Volume : 92.7 fL  Mean Cell Hemoglobin : 30.3 pg  Mean Cell Hemoglobin Concentration : 32.7 %  Auto Neutrophil # : x  Auto Lymphocyte # : x  Auto Monocyte # : x  Auto Eosinophil # : x  Auto Basophil # : x  Auto Neutrophil % : x  Auto Lymphocyte % : x  Auto Monocyte % : x  Auto Eosinophil % : x  Auto Basophil % : x      Serial CBC's  11-05 @ 05:43  Hct-40.7 / Hgb-13.3 / Plat-393 / RBC-4.39 / WBC-5.61  Serial CBC's  11-03 @ 06:00  Hct-42.5 / Hgb-13.9 / Plat-390 / RBC-4.65 / WBC-5.84  Serial CBC's  11-02 @ 06:00  Hct-41.6 / Hgb-13.6 / Plat-374 / RBC-4.58 / WBC-5.68      11-05    140  |  97<L>  |  17  ----------------------------<  104<H>  4.2   |  27  |  0.50    Ca    8.7      05 Nov 2018 05:43        PT/INR - ( 05 Nov 2018 05:43 )   PT: 11.9 SEC;   INR: 1.04          PTT - ( 05 Nov 2018 05:43 )  PTT:31.0 SEC

## 2018-11-07 NOTE — PROGRESS NOTE ADULT - PROBLEM SELECTOR PLAN 2
Will change Lovenox to 80 mg SQ qd starting 11/6/18 Hold  Lovenox to 80 mg sq for paracenthesis 11/8 AM with proceedure team

## 2018-11-07 NOTE — PROGRESS NOTE ADULT - ATTENDING COMMENTS
Lovenox on hold for para 11/8 - Procedure team will do 11/8  Chemo ends 11/7  Pain is controlled.  d/c planning 11/8

## 2018-11-08 VITALS
TEMPERATURE: 98 F | HEART RATE: 103 BPM | SYSTOLIC BLOOD PRESSURE: 107 MMHG | RESPIRATION RATE: 18 BRPM | OXYGEN SATURATION: 98 % | DIASTOLIC BLOOD PRESSURE: 62 MMHG

## 2018-11-08 LAB
ALBUMIN SERPL ELPH-MCNC: 3 G/DL — LOW (ref 3.3–5)
ALP SERPL-CCNC: 133 U/L — HIGH (ref 40–120)
ALT FLD-CCNC: 70 U/L — HIGH (ref 4–41)
AST SERPL-CCNC: 74 U/L — HIGH (ref 4–40)
BASOPHILS # BLD AUTO: 0 K/UL — SIGNIFICANT CHANGE UP (ref 0–0.2)
BASOPHILS NFR BLD AUTO: 0 % — SIGNIFICANT CHANGE UP (ref 0–2)
BILIRUB SERPL-MCNC: 1.1 MG/DL — SIGNIFICANT CHANGE UP (ref 0.2–1.2)
BUN SERPL-MCNC: 17 MG/DL — SIGNIFICANT CHANGE UP (ref 7–23)
CALCIUM SERPL-MCNC: 9.1 MG/DL — SIGNIFICANT CHANGE UP (ref 8.4–10.5)
CHLORIDE SERPL-SCNC: 92 MMOL/L — LOW (ref 98–107)
CO2 SERPL-SCNC: 28 MMOL/L — SIGNIFICANT CHANGE UP (ref 22–31)
CREAT SERPL-MCNC: 0.52 MG/DL — SIGNIFICANT CHANGE UP (ref 0.5–1.3)
EOSINOPHIL # BLD AUTO: 0.01 K/UL — SIGNIFICANT CHANGE UP (ref 0–0.5)
EOSINOPHIL NFR BLD AUTO: 0.2 % — SIGNIFICANT CHANGE UP (ref 0–6)
GLUCOSE SERPL-MCNC: 103 MG/DL — HIGH (ref 70–99)
HCT VFR BLD CALC: 40.2 % — SIGNIFICANT CHANGE UP (ref 39–50)
HGB BLD-MCNC: 13.2 G/DL — SIGNIFICANT CHANGE UP (ref 13–17)
IMM GRANULOCYTES # BLD AUTO: 0.01 # — SIGNIFICANT CHANGE UP
IMM GRANULOCYTES NFR BLD AUTO: 0.2 % — SIGNIFICANT CHANGE UP (ref 0–1.5)
LDH SERPL L TO P-CCNC: 271 U/L — HIGH (ref 135–225)
LYMPHOCYTES # BLD AUTO: 1.07 K/UL — SIGNIFICANT CHANGE UP (ref 1–3.3)
LYMPHOCYTES # BLD AUTO: 23.8 % — SIGNIFICANT CHANGE UP (ref 13–44)
MAGNESIUM SERPL-MCNC: 2.3 MG/DL — SIGNIFICANT CHANGE UP (ref 1.6–2.6)
MCHC RBC-ENTMCNC: 29.7 PG — SIGNIFICANT CHANGE UP (ref 27–34)
MCHC RBC-ENTMCNC: 32.8 % — SIGNIFICANT CHANGE UP (ref 32–36)
MCV RBC AUTO: 90.3 FL — SIGNIFICANT CHANGE UP (ref 80–100)
MONOCYTES # BLD AUTO: 0.17 K/UL — SIGNIFICANT CHANGE UP (ref 0–0.9)
MONOCYTES NFR BLD AUTO: 3.8 % — SIGNIFICANT CHANGE UP (ref 2–14)
NEUTROPHILS # BLD AUTO: 3.24 K/UL — SIGNIFICANT CHANGE UP (ref 1.8–7.4)
NEUTROPHILS NFR BLD AUTO: 72 % — SIGNIFICANT CHANGE UP (ref 43–77)
NRBC # FLD: 0 — SIGNIFICANT CHANGE UP
PHOSPHATE SERPL-MCNC: 4.4 MG/DL — SIGNIFICANT CHANGE UP (ref 2.5–4.5)
PLATELET # BLD AUTO: 402 K/UL — HIGH (ref 150–400)
PMV BLD: 8.5 FL — SIGNIFICANT CHANGE UP (ref 7–13)
POTASSIUM SERPL-MCNC: 4.7 MMOL/L — SIGNIFICANT CHANGE UP (ref 3.5–5.3)
POTASSIUM SERPL-SCNC: 4.7 MMOL/L — SIGNIFICANT CHANGE UP (ref 3.5–5.3)
PROT SERPL-MCNC: 6.7 G/DL — SIGNIFICANT CHANGE UP (ref 6–8.3)
RBC # BLD: 4.45 M/UL — SIGNIFICANT CHANGE UP (ref 4.2–5.8)
RBC # FLD: 13.9 % — SIGNIFICANT CHANGE UP (ref 10.3–14.5)
SODIUM SERPL-SCNC: 134 MMOL/L — LOW (ref 135–145)
URATE SERPL-MCNC: 3.8 MG/DL — SIGNIFICANT CHANGE UP (ref 3.4–8.8)
WBC # BLD: 4.5 K/UL — SIGNIFICANT CHANGE UP (ref 3.8–10.5)
WBC # FLD AUTO: 4.5 K/UL — SIGNIFICANT CHANGE UP (ref 3.8–10.5)

## 2018-11-08 PROCEDURE — 49083 ABD PARACENTESIS W/IMAGING: CPT

## 2018-11-08 PROCEDURE — 99239 HOSP IP/OBS DSCHRG MGMT >30: CPT | Mod: 25

## 2018-11-08 PROCEDURE — 99233 SBSQ HOSP IP/OBS HIGH 50: CPT | Mod: GC

## 2018-11-08 RX ORDER — ONDANSETRON 8 MG/1
4 TABLET, FILM COATED ORAL ONCE
Qty: 0 | Refills: 0 | Status: COMPLETED | OUTPATIENT
Start: 2018-11-08 | End: 2018-11-08

## 2018-11-08 RX ORDER — SENNA PLUS 8.6 MG/1
2 TABLET ORAL
Qty: 0 | Refills: 0 | COMMUNITY
Start: 2018-11-08

## 2018-11-08 RX ORDER — ENOXAPARIN SODIUM 100 MG/ML
80 INJECTION SUBCUTANEOUS
Qty: 30 | Refills: 0 | OUTPATIENT
Start: 2018-11-08 | End: 2018-12-07

## 2018-11-08 RX ORDER — MORPHINE SULFATE 50 MG/1
15 CAPSULE, EXTENDED RELEASE ORAL EVERY 12 HOURS
Qty: 0 | Refills: 0 | Status: DISCONTINUED | OUTPATIENT
Start: 2018-11-08 | End: 2018-11-08

## 2018-11-08 RX ORDER — ENOXAPARIN SODIUM 100 MG/ML
80 INJECTION SUBCUTANEOUS DAILY
Qty: 0 | Refills: 0 | Status: DISCONTINUED | OUTPATIENT
Start: 2018-11-08 | End: 2018-11-08

## 2018-11-08 RX ORDER — OXYCODONE HYDROCHLORIDE 5 MG/1
10 TABLET ORAL EVERY 4 HOURS
Qty: 0 | Refills: 0 | Status: DISCONTINUED | OUTPATIENT
Start: 2018-11-08 | End: 2018-11-08

## 2018-11-08 RX ORDER — OXYCODONE HYDROCHLORIDE 5 MG/1
1 TABLET ORAL
Qty: 20 | Refills: 0 | OUTPATIENT
Start: 2018-11-08 | End: 2018-11-12

## 2018-11-08 RX ORDER — ACETAMINOPHEN 500 MG
2 TABLET ORAL
Qty: 0 | Refills: 0 | COMMUNITY
Start: 2018-11-08

## 2018-11-08 RX ORDER — FONDAPARINUX SODIUM 2.5 MG/.5ML
1 INJECTION, SOLUTION SUBCUTANEOUS
Qty: 0 | Refills: 0 | COMMUNITY

## 2018-11-08 RX ORDER — DOCUSATE SODIUM 100 MG
1 CAPSULE ORAL
Qty: 0 | Refills: 0 | COMMUNITY
Start: 2018-11-08

## 2018-11-08 RX ORDER — MORPHINE SULFATE 50 MG/1
1 CAPSULE, EXTENDED RELEASE ORAL
Qty: 60 | Refills: 0 | OUTPATIENT
Start: 2018-11-08 | End: 2018-12-07

## 2018-11-08 RX ORDER — POLYETHYLENE GLYCOL 3350 17 G/17G
17 POWDER, FOR SOLUTION ORAL
Qty: 0 | Refills: 0 | COMMUNITY
Start: 2018-11-08

## 2018-11-08 RX ADMIN — Medication 1 TABLET(S): at 12:18

## 2018-11-08 RX ADMIN — MORPHINE SULFATE 2 MILLIGRAM(S): 50 CAPSULE, EXTENDED RELEASE ORAL at 03:20

## 2018-11-08 RX ADMIN — MORPHINE SULFATE 15 MILLIGRAM(S): 50 CAPSULE, EXTENDED RELEASE ORAL at 07:02

## 2018-11-08 RX ADMIN — CHLORHEXIDINE GLUCONATE 1 APPLICATION(S): 213 SOLUTION TOPICAL at 07:02

## 2018-11-08 RX ADMIN — MORPHINE SULFATE 15 MILLIGRAM(S): 50 CAPSULE, EXTENDED RELEASE ORAL at 17:11

## 2018-11-08 RX ADMIN — ONDANSETRON 4 MILLIGRAM(S): 8 TABLET, FILM COATED ORAL at 03:20

## 2018-11-08 RX ADMIN — ENOXAPARIN SODIUM 80 MILLIGRAM(S): 100 INJECTION SUBCUTANEOUS at 17:14

## 2018-11-08 RX ADMIN — MORPHINE SULFATE 2 MILLIGRAM(S): 50 CAPSULE, EXTENDED RELEASE ORAL at 03:35

## 2018-11-08 NOTE — PROGRESS NOTE ADULT - PROBLEM SELECTOR PROBLEM 1
Gastroesophageal cancer
Gastroesophageal cancer
Other acute pulmonary embolism without acute cor pulmonale
Esophageal cancer, stage IV
Gastroesophageal cancer
Pulmonary embolism

## 2018-11-08 NOTE — PROGRESS NOTE ADULT - PROVIDER SPECIALTY LIST ADULT
Heme/Onc
Hospitalist
Heme/Onc
Heme/Onc

## 2018-11-08 NOTE — PROGRESS NOTE ADULT - PROBLEM SELECTOR PLAN 5
Ms contin 15 mg q12 . Inc Oxy ir to 10 g q4 prn with bowel regimen.  Patient notes pain IS  controlled

## 2018-11-08 NOTE — PROGRESS NOTE ADULT - REASON FOR ADMISSION
Sent in by oncologist
DVT/PE
Sent in by oncologist

## 2018-11-08 NOTE — PROCEDURE NOTE - NSPROCDETAILS_GEN_ALL_CORE
ultrasound utilization/location identified, sterile technique used, catheter introduced, fluid drained/sterile dressing applied
ultrasound utilization/location identified, sterile technique used, catheter introduced, fluid drained

## 2018-11-08 NOTE — PROGRESS NOTE ADULT - ASSESSMENT
57 y/o male with metastatic GE jxn adenocarcinoma (newly dx) admitted with abdominal pain s/p mets to liver , lung and have  peritoneal carcinomatosis.  Dyspnea sec to new PE-Patient had cta of chest- PE   GE adenocarcinoma- to get FOLFOX chemo today  FFt  Severe protein erlinda malnutrition with Thin body  Patient getting  chemotherapy  Procedure team will do para 11/8 today- after holding Lovenox

## 2018-11-08 NOTE — PROGRESS NOTE ADULT - PROBLEM SELECTOR PROBLEM 3
Malignant ascites
Severe protein-calorie malnutrition

## 2018-11-08 NOTE — PROGRESS NOTE ADULT - PROBLEM SELECTOR PROBLEM 2
Gastroesophageal cancer
Malignancy
Pulmonary thromboembolism

## 2018-11-08 NOTE — PROGRESS NOTE ADULT - SUBJECTIVE AND OBJECTIVE BOX
Patient is a 58y old  Male who presents with a chief complaint of Sent in by oncologist (08 Nov 2018 08:59)      SUBJECTIVE / OVERNIGHT EVENTS:  Patient is Ready to go home.  He is awaiting paracenteses first.  Notes his pain is controlled    MEDICATIONS  (STANDING):  chlorhexidine 4% Liquid 1 Application(s) Topical <User Schedule>  docusate sodium 100 milliGRAM(s) Oral two times a day  lidocaine 1% Injectable 10 milliLiter(s) Local Injection once  lidocaine 1% Injectable 10 milliLiter(s) Local Injection once  morphine ER Tablet 15 milliGRAM(s) Oral every 12 hours  multivitamin 1 Tablet(s) Oral daily  polyethylene glycol 3350 17 Gram(s) Oral two times a day  senna 2 Tablet(s) Oral at bedtime    MEDICATIONS  (PRN):  acetaminophen   Tablet .. 650 milliGRAM(s) Oral every 6 hours PRN Temp greater or equal to 38C (100.4F), Mild Pain (1 - 3)  morphine  - Injectable 2 milliGRAM(s) IV Push every 4 hours PRN Moderate Pain (4 - 6) and severe pain  oxyCODONE    IR 10 milliGRAM(s) Oral every 4 hours PRN Moderate Pain (4 - 6) and severe pain  witch hazel Pads 1 Application(s) Topical three times a day PRN hemmorhoidal pain        PHYSICAL EXAM:  Vital Signs Last 24 Hrs  T(C): 36.7 (08 Nov 2018 06:42), Max: 37.3 (07 Nov 2018 21:38)  T(F): 98.1 (08 Nov 2018 06:42), Max: 99.1 (07 Nov 2018 21:38)  HR: 115 (08 Nov 2018 06:42) (75 - 116)  BP: 140/95 (08 Nov 2018 06:42) (127/75 - 140/95)  BP(mean): --  RR: 18 (08 Nov 2018 06:42) (18 - 18)  SpO2: 100% (08 Nov 2018 06:42) (98% - 100%)  GENERAL: NAD,   Thin Man  HEAD:  Atraumatic, Normocephalic  EYES: EOMI, PERRLA, conjunctiva and sclera clear  NECK: Supple, No JVD  CHEST/LUNG: Clear to auscultation bilaterally; No wheeze  HEART: Regular rate and rhythm; No murmurs, rubs, or gallops  ABDOMEN: Soft, Distended, mass palp to mid abdomen  No pAIN  EXTREMITIES:  2+ Peripheral Pulses, No clubbing, cyanosis, or edema  PSYCH: AAOx3  NEUROLOGY: non-focal  SKIN: No rashes or lesions    LABS:                        13.2   4.50  )-----------( 402      ( 08 Nov 2018 07:00 )             40.2     11-08    134<L>  |  92<L>  |  17  ----------------------------<  103<H>  4.7   |  28  |  0.52    Ca    9.1      08 Nov 2018 07:00  Phos  4.4     11-08  Mg     2.3     11-08    TPro  6.7  /  Alb  3.0<L>  /  TBili  1.1  /  DBili  x   /  AST  74<H>  /  ALT  70<H>  /  AlkPhos  133<H>  11-08      RADIOLOGY & ADDITIONAL TESTS:    Imaging Personally Reviewed:    Consultant(s) Notes Reviewed:      Care Discussed with Consultants/Other Providers:  onc Patient is a 58y old  Male who presents with a chief complaint of Sent in by oncologist (08 Nov 2018 08:59)      SUBJECTIVE / OVERNIGHT EVENTS:  Patient is Ready to go home.  He is awaiting paracenteses first.  Notes his pain is controlled    MEDICATIONS  (STANDING):  chlorhexidine 4% Liquid 1 Application(s) Topical <User Schedule>  docusate sodium 100 milliGRAM(s) Oral two times a day  lidocaine 1% Injectable 10 milliLiter(s) Local Injection once  lidocaine 1% Injectable 10 milliLiter(s) Local Injection once  morphine ER Tablet 15 milliGRAM(s) Oral every 12 hours  multivitamin 1 Tablet(s) Oral daily  polyethylene glycol 3350 17 Gram(s) Oral two times a day  senna 2 Tablet(s) Oral at bedtime    MEDICATIONS  (PRN):  acetaminophen   Tablet .. 650 milliGRAM(s) Oral every 6 hours PRN Temp greater or equal to 38C (100.4F), Mild Pain (1 - 3)  morphine  - Injectable 2 milliGRAM(s) IV Push every 4 hours PRN Moderate Pain (4 - 6) and severe pain  oxyCODONE    IR 10 milliGRAM(s) Oral every 4 hours PRN Moderate Pain (4 - 6) and severe pain  witch hazel Pads 1 Application(s) Topical three times a day PRN hemmorhoidal pain        PHYSICAL EXAM:  Vital Signs Last 24 Hrs  T(C): 36.7 (08 Nov 2018 06:42), Max: 37.3 (07 Nov 2018 21:38)  T(F): 98.1 (08 Nov 2018 06:42), Max: 99.1 (07 Nov 2018 21:38)  HR: 115 (08 Nov 2018 06:42) (75 - 116)  BP: 140/95 (08 Nov 2018 06:42) (127/75 - 140/95)  BP(mean): --  RR: 18 (08 Nov 2018 06:42) (18 - 18)  SpO2: 100% (08 Nov 2018 06:42) (98% - 100%)  GENERAL: NAD,   Thin Man  HEAD:  Atraumatic, Normocephalic  EYES: EOMI, PERRLA, conjunctiva and sclera clear  NECK: Supple, No JVD  CHEST/LUNG: Clear to auscultation bilaterally; No wheeze  R chest port  HEART: Regular rate and rhythm; No murmurs, rubs, or gallops  ABDOMEN: Soft, Distended, mass palp to mid abdomen  No pAIN  EXTREMITIES:  2+ Peripheral Pulses, No clubbing, cyanosis, or edema  PSYCH: AAOx3  NEUROLOGY: non-focal  SKIN: No rashes or lesions    LABS:                        13.2   4.50  )-----------( 402      ( 08 Nov 2018 07:00 )             40.2     11-08    134<L>  |  92<L>  |  17  ----------------------------<  103<H>  4.7   |  28  |  0.52    Ca    9.1      08 Nov 2018 07:00  Phos  4.4     11-08  Mg     2.3     11-08    TPro  6.7  /  Alb  3.0<L>  /  TBili  1.1  /  DBili  x   /  AST  74<H>  /  ALT  70<H>  /  AlkPhos  133<H>  11-08      RADIOLOGY & ADDITIONAL TESTS:    Imaging Personally Reviewed:    Consultant(s) Notes Reviewed:      Care Discussed with Consultants/Other Providers:  onc

## 2018-11-08 NOTE — PROGRESS NOTE ADULT - PROBLEM SELECTOR PROBLEM 4
Esophageal cancer, stage IV
Prophylactic measure
Esophageal cancer, stage IV

## 2018-11-08 NOTE — PROGRESS NOTE ADULT - ATTENDING COMMENTS
Paracenthesis today then HOME.  Patient is happy and ready to go home Paracenteses today then HOME.  Patient is happy and ready to go home  Pain is controlled.  Out patient f/u with Onc- Dr Fuentes at [facility], and [facility] Pain Dr Metz    42 min to coordinate discharge

## 2018-11-08 NOTE — PROGRESS NOTE ADULT - PROBLEM SELECTOR PLAN 3
Please perform therapeutic paracentesis prior to pt's discharge
-Nutrition eval.
ensures TID

## 2018-11-08 NOTE — PROCEDURE NOTE - ADDITIONAL PROCEDURE DETAILS
Primary team requesting diagnostic paracentesis. Pt initially examined with bedside US, 3.5 cm pocket found on left. Site cleansed with chlorhexadne swabs x3 Sterile technique maintained throughout procedure. 5cc of 1% lidocaine injected down to peritoneum at pre-determined site. Using 18G needle, 60cc of serous fluid aspirated without complication.
Primary team requesting therapeutic paracentesis at rec of primary team. Pt initially examined with bedside US, 4.5 cm pocket found in LUQ.. Site cleansed with chlorhexadne swabs x3, then sterile drape applied. Sterile technique maintained throughout procedure. 8cc of 1% lidocaine injected down to peritoneum at pre-determined site. Small skin incision then made to allow catheter entry, placement confirmed with return of ascitic fluid. Catheter remained in place after inserted over needle, needle removed. Catheter connected to wall suction for removal of 2.0 L straw colored fluid. Catheter removed and pressure dressing applied. Pt tolerated procedure well.

## 2018-11-12 ENCOUNTER — APPOINTMENT (OUTPATIENT)
Dept: GERIATRICS | Facility: CLINIC | Age: 58
End: 2018-11-12
Payer: COMMERCIAL

## 2018-11-12 ENCOUNTER — INBOUND DOCUMENT (OUTPATIENT)
Age: 58
End: 2018-11-12

## 2018-11-12 VITALS
OXYGEN SATURATION: 100 % | WEIGHT: 103.62 LBS | SYSTOLIC BLOOD PRESSURE: 133 MMHG | HEART RATE: 116 BPM | BODY MASS INDEX: 16.13 KG/M2 | DIASTOLIC BLOOD PRESSURE: 96 MMHG | RESPIRATION RATE: 18 BRPM | TEMPERATURE: 98 F

## 2018-11-12 DIAGNOSIS — R63.0 ANOREXIA: ICD-10-CM

## 2018-11-12 DIAGNOSIS — C15.9 MALIGNANT NEOPLASM OF ESOPHAGUS, UNSPECIFIED: ICD-10-CM

## 2018-11-12 DIAGNOSIS — R18.0 MALIGNANT ASCITES: ICD-10-CM

## 2018-11-12 DIAGNOSIS — R10.84 GENERALIZED ABDOMINAL PAIN: ICD-10-CM

## 2018-11-12 DIAGNOSIS — Z51.5 ENCOUNTER FOR PALLIATIVE CARE: ICD-10-CM

## 2018-11-12 PROCEDURE — 99204 OFFICE O/P NEW MOD 45 MIN: CPT

## 2018-11-14 ENCOUNTER — APPOINTMENT (OUTPATIENT)
Dept: HEMATOLOGY ONCOLOGY | Facility: CLINIC | Age: 58
End: 2018-11-14
Payer: COMMERCIAL

## 2018-11-14 VITALS
SYSTOLIC BLOOD PRESSURE: 123 MMHG | HEART RATE: 110 BPM | WEIGHT: 102.96 LBS | RESPIRATION RATE: 16 BRPM | TEMPERATURE: 98.9 F | DIASTOLIC BLOOD PRESSURE: 91 MMHG | BODY MASS INDEX: 16.03 KG/M2 | OXYGEN SATURATION: 100 %

## 2018-11-14 DIAGNOSIS — I26.99 OTHER PULMONARY EMBOLISM W/OUT ACUTE COR PULMONALE: ICD-10-CM

## 2018-11-14 DIAGNOSIS — B37.0 CANDIDAL STOMATITIS: ICD-10-CM

## 2018-11-14 PROCEDURE — 99215 OFFICE O/P EST HI 40 MIN: CPT

## 2018-11-14 RX ORDER — NYSTATIN 100000 [USP'U]/ML
100000 SUSPENSION ORAL
Qty: 1 | Refills: 0 | Status: ACTIVE | COMMUNITY
Start: 2018-11-14 | End: 1900-01-01

## 2018-11-15 PROBLEM — I26.99 ACUTE PULMONARY EMBOLISM: Status: ACTIVE | Noted: 2018-10-27

## 2018-11-15 PROBLEM — B37.0 CANDIDA, ORAL: Status: ACTIVE | Noted: 2018-11-14

## 2018-11-15 NOTE — REVIEW OF SYSTEMS
[Fatigue] : fatigue [Recent Change In Weight] : ~T recent weight change [Negative] : Allergic/Immunologic

## 2018-11-18 ENCOUNTER — FORM ENCOUNTER (OUTPATIENT)
Age: 58
End: 2018-11-18

## 2018-11-19 ENCOUNTER — APPOINTMENT (OUTPATIENT)
Dept: ULTRASOUND IMAGING | Facility: IMAGING CENTER | Age: 58
End: 2018-11-19
Payer: COMMERCIAL

## 2018-11-19 ENCOUNTER — OUTPATIENT (OUTPATIENT)
Dept: OUTPATIENT SERVICES | Facility: HOSPITAL | Age: 58
LOS: 1 days | End: 2018-11-19
Payer: COMMERCIAL

## 2018-11-19 DIAGNOSIS — Z90.49 ACQUIRED ABSENCE OF OTHER SPECIFIED PARTS OF DIGESTIVE TRACT: Chronic | ICD-10-CM

## 2018-11-19 DIAGNOSIS — R18.0 MALIGNANT ASCITES: ICD-10-CM

## 2018-11-19 PROCEDURE — 49083 ABD PARACENTESIS W/IMAGING: CPT

## 2018-11-20 PROBLEM — R10.84 GENERALIZED ABDOMINAL PAIN: Status: ACTIVE | Noted: 2018-11-20

## 2018-11-20 PROBLEM — Z51.5 ENCOUNTER FOR PALLIATIVE CARE: Status: ACTIVE | Noted: 2018-11-20

## 2018-11-20 PROBLEM — C15.9 ESOPHAGEAL CANCER, STAGE IV: Status: ACTIVE | Noted: 2018-10-19

## 2018-11-20 PROBLEM — R63.0 ANOREXIA: Status: ACTIVE | Noted: 2018-10-19

## 2018-11-20 PROBLEM — R18.0 MALIGNANT ASCITES: Status: ACTIVE | Noted: 2018-11-15

## 2018-11-28 LAB — FUNGUS SPEC QL CULT: SIGNIFICANT CHANGE UP

## 2018-12-03 ENCOUNTER — APPOINTMENT (OUTPATIENT)
Dept: GERIATRICS | Facility: CLINIC | Age: 58
End: 2018-12-03

## 2018-12-11 LAB — ACID FAST STN FLD: SIGNIFICANT CHANGE UP

## 2019-01-09 NOTE — ED ADULT TRIAGE NOTE - AS TEMP SITE
[___ Weeks ago] :  [unfilled] weeks ago [FreeTextEntry8] : 39 yo wf co sore throat coughing shortness of breath headache and mild nasal congestion cest congestion fever and body aches for over a week cxr showed left side infiltrate\par flu test negative. I went to [facility] 2 days. I am on doxycycline and prednisone. I ws on zithromax\par I feel terrible.my airway is tight. i am using nebulizer q 4 hrs. Inhaler is not working. . I am not able to get a deep cough. After the nebulizer I am able to cough and expectorate. I had blood tinged sputum.  My fevers went away for 2 days. I am hot and cold. I  oral

## 2019-07-18 NOTE — ASSESSMENT
[Supportive] : Goals of care discussed with patient: Supportive [Palliative Care Plan] : Patient was apprised of incurable nature of disease.  Hospice and Palliative care options discussed. [FreeTextEntry1] : Hospice referral will be made in am.

## 2019-07-18 NOTE — HISTORY OF PRESENT ILLNESS
[Disease: _____________________] : Disease: [unfilled] [AJCC Stage: ____] : AJCC Stage: [unfilled] [Therapy: ___] : Therapy: [unfilled] [Cycle: ___] : Cycle: [unfilled] [Day: ___] : Day: [unfilled] [de-identified] : 58 year-old male reports a 2-3 month history (July 2018) of generalized abdominal discomfort, bloating, diminished appetite (with subsequent progressive weight loss - 40 lbs to date) and more frequent stools with thin caliber stools at times and sensation of incomplete evacuation at times. He denies nausea/vomiting, fever, melena or hematochezia.\par \par He was referred for a CT of the abdomen and pelvis on 9/11/18 which showed an irregular appearance of the sigmoid colon with stranding in the pericolonic fat within the pelvis and questionable tiny deep left pelvis collection. There is small volume ascites with soft tissue nodularity to the omentum concerning for peritoneal carcinomatosis and indeterminate liver lesions for which further characterization with MRI is recommended. \par \par On 9/18/18 Dr. Koenig performed a colonoscopy (the patient's fist) which showed a stenotic segment in the sigmoid colon secondary to an extrinsic mass, likely at 35 cm and mucosal abnormality. All pathology was benign. \par \par Bo was referred to Dr. Art for evaluation who ordered a MRI Abdomen and PET/CT. \par \par 10/17/18: PET/CT: multifocal bilobar liver metastasis, peritoneal carcinomatosis with large volume ascites, focal thickening of the GE junction, possibly malignant. \par 10/22/18: EGD" GEJ mass, bx adenoca\par 10/23/18: MRI Abdomen: GEJ mass, bilobar liver mets, ascites \par 10/29-11/8/18: admitted to [facility] with acute PE/DVT, s/p 2 therapeutic paracentesis, 11/5: C1 mFOLFOX - dose reduced \par \par \par  \par  [de-identified] : moderately-poorly differentiated adenocarcinoma [de-identified] : CA 19-9 59  CEA 37 [de-identified] : Bo presents for f/u accompanied by wife. He was hospitalized for 1 week oct 30 for acute PE. STarted on heparin gtt, transitioned to Xarelto on d/c. \par s/p 2 paracentesis, about 2 L removed both times. Had mild relief with this for short duration. \par Received C1 FOLFOX, dose reduced for poor ECOG. Tolerated it without any significant issues. However, much weaker today. In a wheelchair. \par Requires assistance with all ADLs. Unsteady on feet. Eating barely 300 calories per day. Abd is distended and tight. Denies any n/v\par Lost 13 lbs since last visit. \par noticed white plaques in his mouth. Non tender  [Date: ____________] : Patient's last distress assessment performed on [unfilled]. [6 - Distress Level] : Distress Level: 6 [Patient given social work contact information and resource sheet] : Patient was given social work contact information and resource sheet

## 2019-07-18 NOTE — CONSULT LETTER
[Dear  ___] : Dear  [unfilled], [Courtesy Letter:] : I had the pleasure of seeing your patient, [unfilled], in my office today. [Please see my note below.] : Please see my note below. [Consult Closing:] : Thank you very much for allowing me to participate in the care of this patient.  If you have any questions, please do not hesitate to contact me. [Sincerely,] : Sincerely, [DrRoberto  ___] : Dr. WHITAKER [FreeTextEntry3] : Shahrzad Fuentes D.O. \par Attending Physician \par [facility]\par  \par [facility] \par Tel: [phone]\par Fax: [phone]\par

## 2019-07-18 NOTE — PHYSICAL EXAM
[Capable of only limited self care, confined to bed or chair more than 50% of waking hours] : Status 3- Capable of only limited self care, confined to bed or chair more than 50% of waking hours [Cachectic] : cachectic [Thrush] : thrush [Normal] : affect appropriate [de-identified] : significan temporal waisting  [de-identified] : distended, + fluid wave

## 2019-09-27 NOTE — PATIENT PROFILE ADULT - FALL HARM RISK
Pt called, needs refill on medication that is a facial cream that needs to be kept in the frig. Pharmacy-[facility]. Please call pt at [phone]    Pt did not know name of medication. coagulation(Bleeding disorder R/T clinical cond/anti-coags)/other

## 2022-11-16 NOTE — DISCHARGE NOTE ADULT - DURABLE MEDICAL EQUIPMENT AGENCY
No Pt to have hospital bed delivered to home by [facility]  11/8/18 after 5pm Pt to have hospital bed delivered to home by [facility]  11/8/18 after 5pm, RW to bedside

## 2024-05-06 NOTE — ED PROVIDER NOTE - MEDICAL DECISION MAKING DETAILS
Patient: Carmen Mcneill    Procedure Summary       Date: 05/06/24 Room / Location: [facility] [place]    Anesthesia Start: 1354 Anesthesia Stop: 1425    Procedure: COLONOSCOPY Diagnosis: Routine health maintenance    Scheduled Providers: Susanne DUBON MD Responsible Provider: Roque Lindsay MD    Anesthesia Type: general ASA Status: 2            Anesthesia Type: general    Vitals Value Taken Time   /58 05/06/24 1425   Temp 36.7 05/06/24 1425   Pulse 53 05/06/24 1425   Resp 12 05/06/24 1425   SpO2 100 05/06/24 1425       Anesthesia Post Evaluation    Patient location during evaluation: bedside  Patient participation: complete - patient participated  Level of consciousness: awake  Pain management: adequate  Airway patency: patent  Cardiovascular status: acceptable  Respiratory status: acceptable  Hydration status: acceptable  Postoperative Nausea and Vomiting: none      No notable events documented.    
59 yo M with lower abd pain  -ct abd/pel, basic labs, ua, cx, morphine, ns hydration  -f/u results, reeval

## 2024-06-22 NOTE — ED PROVIDER NOTE - CROS ED [facility] ALL NEG
6/22 @ 1755- received a call from [facility] for positive blood cultures. Will pass along to day shift charge.   - - -
